# Patient Record
Sex: MALE | Race: WHITE | NOT HISPANIC OR LATINO
[De-identification: names, ages, dates, MRNs, and addresses within clinical notes are randomized per-mention and may not be internally consistent; named-entity substitution may affect disease eponyms.]

---

## 2017-06-14 ENCOUNTER — APPOINTMENT (OUTPATIENT)
Dept: ORTHOPEDIC SURGERY | Facility: CLINIC | Age: 62
End: 2017-06-14

## 2017-06-14 VITALS
HEART RATE: 66 BPM | SYSTOLIC BLOOD PRESSURE: 134 MMHG | HEIGHT: 71 IN | WEIGHT: 155 LBS | BODY MASS INDEX: 21.7 KG/M2 | DIASTOLIC BLOOD PRESSURE: 90 MMHG

## 2017-06-14 DIAGNOSIS — S86.811A STRAIN OF OTHER MUSCLE(S) AND TENDON(S) AT LOWER LEG LEVEL, RIGHT LEG, INITIAL ENCOUNTER: ICD-10-CM

## 2017-06-14 DIAGNOSIS — Z87.891 PERSONAL HISTORY OF NICOTINE DEPENDENCE: ICD-10-CM

## 2017-06-14 DIAGNOSIS — Z78.9 OTHER SPECIFIED HEALTH STATUS: ICD-10-CM

## 2017-06-14 DIAGNOSIS — Z80.0 FAMILY HISTORY OF MALIGNANT NEOPLASM OF DIGESTIVE ORGANS: ICD-10-CM

## 2018-08-06 ENCOUNTER — APPOINTMENT (OUTPATIENT)
Dept: ORTHOPEDIC SURGERY | Facility: CLINIC | Age: 63
End: 2018-08-06
Payer: SELF-PAY

## 2018-08-06 VITALS
HEART RATE: 78 BPM | BODY MASS INDEX: 21.98 KG/M2 | WEIGHT: 157 LBS | DIASTOLIC BLOOD PRESSURE: 85 MMHG | SYSTOLIC BLOOD PRESSURE: 135 MMHG | HEIGHT: 71 IN

## 2018-08-06 PROCEDURE — 99204 OFFICE O/P NEW MOD 45 MIN: CPT

## 2018-08-06 PROCEDURE — 73502 X-RAY EXAM HIP UNI 2-3 VIEWS: CPT | Mod: LT

## 2018-09-25 ENCOUNTER — APPOINTMENT (OUTPATIENT)
Dept: ORTHOPEDIC SURGERY | Facility: CLINIC | Age: 63
End: 2018-09-25
Payer: MEDICAID

## 2018-09-25 ENCOUNTER — TRANSCRIPTION ENCOUNTER (OUTPATIENT)
Age: 63
End: 2018-09-25

## 2018-09-25 VITALS
DIASTOLIC BLOOD PRESSURE: 100 MMHG | HEART RATE: 68 BPM | SYSTOLIC BLOOD PRESSURE: 161 MMHG | HEIGHT: 71 IN | WEIGHT: 157 LBS | BODY MASS INDEX: 21.98 KG/M2

## 2018-09-25 PROCEDURE — 99214 OFFICE O/P EST MOD 30 MIN: CPT

## 2018-09-25 PROCEDURE — 73502 X-RAY EXAM HIP UNI 2-3 VIEWS: CPT | Mod: LT

## 2018-11-13 ENCOUNTER — OUTPATIENT (OUTPATIENT)
Dept: OUTPATIENT SERVICES | Facility: HOSPITAL | Age: 63
LOS: 1 days | End: 2018-11-13
Payer: MEDICAID

## 2018-11-13 ENCOUNTER — APPOINTMENT (OUTPATIENT)
Dept: RADIOLOGY | Facility: CLINIC | Age: 63
End: 2018-11-13
Payer: MEDICAID

## 2018-11-13 DIAGNOSIS — M16.12 UNILATERAL PRIMARY OSTEOARTHRITIS, LEFT HIP: ICD-10-CM

## 2018-11-13 PROCEDURE — 73525 CONTRAST X-RAY OF HIP: CPT | Mod: 26,LT

## 2018-11-13 PROCEDURE — 27093 INJECTION FOR HIP X-RAY: CPT | Mod: LT

## 2018-11-13 PROCEDURE — 27093 INJECTION FOR HIP X-RAY: CPT

## 2018-11-13 PROCEDURE — 73525 CONTRAST X-RAY OF HIP: CPT

## 2019-01-04 ENCOUNTER — CHART COPY (OUTPATIENT)
Age: 64
End: 2019-01-04

## 2019-01-29 ENCOUNTER — APPOINTMENT (OUTPATIENT)
Dept: ORTHOPEDIC SURGERY | Facility: CLINIC | Age: 64
End: 2019-01-29
Payer: MEDICAID

## 2019-01-29 VITALS — HEIGHT: 71 IN | BODY MASS INDEX: 22.12 KG/M2 | WEIGHT: 158 LBS

## 2019-01-29 PROCEDURE — 99214 OFFICE O/P EST MOD 30 MIN: CPT

## 2019-01-29 NOTE — DISCUSSION/SUMMARY
[de-identified] : This patient has left hip osteoarthritis.  He has failed a course of conservative management and would like to proceed with a direct anterior approach left total hip arthroplasty.  He is an excellent candidate for an ambulatory surgery and this will be potentially performed at our ambulatory surgery center.  He does understand that this is a relatively new program.  He understands that he will need to discharge home same day of surgery and he is willing and excited about participating in this program.\par \par The patient is an appropriate candidate for consideration of left total hip replacement. An extensive discussion was conducted of the natural history of the disease and the variety of surgical and non-surgical treatment options available to the patient. A risk/benefit analysis was discussed with the patient reviewing the advantages and disadvantages of surgical intervention at this time. Both the level and length of the patient's pain have made additional conservative treatment measures consisting of physical therapy, and/or corticosteroids contraindicated. A full explanation was given of the nature and the purpose of the procedure and anesthesia, its benefits, possible alternative methods of diagnosis or treatment, the risks involved, the possibility of complications, the foreseeable consequences of the procedure and the possible results of the non-treatment.\par No guarantee or assurance was made as to the results that may be obtained. Specifically, the risks were identified to include, but are not limited to the following: Infection, phlebitis, pulmonary embolism, death, paralysis, dislocation, pain, stiffness, instability, limp, weakness, breakage, leg-length inequality, uncontrolled bleeding, nerve injury, blood vessel injury, pressure sores, anesthetic risks, delayed healing of wound and bone, and wear and loosening. Further discussion was undertaken with the patient about the details of surgical preparation, treatment, and postoperative rehabilitation including medical clearance, autotransfusion, the hospital course, and the postoperative rehabilitation involved. All in all, I feel that this patient is a good candidate for surgical reconstruction.\par

## 2019-01-29 NOTE — HISTORY OF PRESENT ILLNESS
[de-identified] : This is very nice 63-year-old gentleman experiencing 3 years of left hip pain, which is severe in intensity. I have previously diagnosed left hip osteoarthritis. Patient endorses groin and thigh pain. The pain moderately limits activities of daily living. Walking tolerance is reduced. Physical therapy did nto help and gretchen did a home exercise program. He has also tried an intra-articular cortisone injection. He does not use a cane or walker. He has previously tried NSAIDs which do help slightly. The pain is not traveling down the leg and it is not associated with numbness or tingling or weakness.

## 2019-02-14 ENCOUNTER — OUTPATIENT (OUTPATIENT)
Dept: OUTPATIENT SERVICES | Facility: HOSPITAL | Age: 64
LOS: 1 days | End: 2019-02-14
Payer: MEDICAID

## 2019-02-14 ENCOUNTER — OTHER (OUTPATIENT)
Age: 64
End: 2019-02-14

## 2019-02-14 VITALS
TEMPERATURE: 98 F | HEART RATE: 67 BPM | OXYGEN SATURATION: 98 % | HEIGHT: 71 IN | DIASTOLIC BLOOD PRESSURE: 72 MMHG | WEIGHT: 158.95 LBS | SYSTOLIC BLOOD PRESSURE: 140 MMHG | RESPIRATION RATE: 16 BRPM

## 2019-02-14 DIAGNOSIS — M16.12 UNILATERAL PRIMARY OSTEOARTHRITIS, LEFT HIP: ICD-10-CM

## 2019-02-14 DIAGNOSIS — Z01.818 ENCOUNTER FOR OTHER PREPROCEDURAL EXAMINATION: ICD-10-CM

## 2019-02-14 DIAGNOSIS — Z29.9 ENCOUNTER FOR PROPHYLACTIC MEASURES, UNSPECIFIED: ICD-10-CM

## 2019-02-14 DIAGNOSIS — Z96.642 PRESENCE OF LEFT ARTIFICIAL HIP JOINT: Chronic | ICD-10-CM

## 2019-02-14 LAB
ANION GAP SERPL CALC-SCNC: 13 MMOL/L — SIGNIFICANT CHANGE UP (ref 5–17)
BLD GP AB SCN SERPL QL: NEGATIVE — SIGNIFICANT CHANGE UP
BUN SERPL-MCNC: 19 MG/DL — SIGNIFICANT CHANGE UP (ref 7–23)
CALCIUM SERPL-MCNC: 10.2 MG/DL — SIGNIFICANT CHANGE UP (ref 8.4–10.5)
CHLORIDE SERPL-SCNC: 103 MMOL/L — SIGNIFICANT CHANGE UP (ref 96–108)
CO2 SERPL-SCNC: 25 MMOL/L — SIGNIFICANT CHANGE UP (ref 22–31)
CREAT SERPL-MCNC: 0.97 MG/DL — SIGNIFICANT CHANGE UP (ref 0.5–1.3)
GLUCOSE SERPL-MCNC: 100 MG/DL — HIGH (ref 70–99)
HBA1C BLD-MCNC: 5.6 % — SIGNIFICANT CHANGE UP (ref 4–5.6)
HCT VFR BLD CALC: 46.5 % — SIGNIFICANT CHANGE UP (ref 39–50)
HGB BLD-MCNC: 15.5 G/DL — SIGNIFICANT CHANGE UP (ref 13–17)
MCHC RBC-ENTMCNC: 30.8 PG — SIGNIFICANT CHANGE UP (ref 27–34)
MCHC RBC-ENTMCNC: 33.3 GM/DL — SIGNIFICANT CHANGE UP (ref 32–36)
MCV RBC AUTO: 92.3 FL — SIGNIFICANT CHANGE UP (ref 80–100)
MRSA PCR RESULT.: SIGNIFICANT CHANGE UP
PLATELET # BLD AUTO: 243 K/UL — SIGNIFICANT CHANGE UP (ref 150–400)
POTASSIUM SERPL-MCNC: 4.4 MMOL/L — SIGNIFICANT CHANGE UP (ref 3.5–5.3)
POTASSIUM SERPL-SCNC: 4.4 MMOL/L — SIGNIFICANT CHANGE UP (ref 3.5–5.3)
RBC # BLD: 5.04 M/UL — SIGNIFICANT CHANGE UP (ref 4.2–5.8)
RBC # FLD: 13.3 % — SIGNIFICANT CHANGE UP (ref 10.3–14.5)
RH IG SCN BLD-IMP: POSITIVE — SIGNIFICANT CHANGE UP
S AUREUS DNA NOSE QL NAA+PROBE: SIGNIFICANT CHANGE UP
SODIUM SERPL-SCNC: 141 MMOL/L — SIGNIFICANT CHANGE UP (ref 135–145)
WBC # BLD: 5.23 K/UL — SIGNIFICANT CHANGE UP (ref 3.8–10.5)
WBC # FLD AUTO: 5.23 K/UL — SIGNIFICANT CHANGE UP (ref 3.8–10.5)

## 2019-02-14 PROCEDURE — G0463: CPT

## 2019-02-14 PROCEDURE — 86850 RBC ANTIBODY SCREEN: CPT

## 2019-02-14 PROCEDURE — 86900 BLOOD TYPING SEROLOGIC ABO: CPT

## 2019-02-14 PROCEDURE — 85027 COMPLETE CBC AUTOMATED: CPT

## 2019-02-14 PROCEDURE — 83036 HEMOGLOBIN GLYCOSYLATED A1C: CPT

## 2019-02-14 PROCEDURE — 87640 STAPH A DNA AMP PROBE: CPT

## 2019-02-14 PROCEDURE — 80048 BASIC METABOLIC PNL TOTAL CA: CPT

## 2019-02-14 PROCEDURE — 86901 BLOOD TYPING SEROLOGIC RH(D): CPT

## 2019-02-14 PROCEDURE — 87641 MR-STAPH DNA AMP PROBE: CPT

## 2019-02-14 RX ORDER — SODIUM CHLORIDE 9 MG/ML
3 INJECTION INTRAMUSCULAR; INTRAVENOUS; SUBCUTANEOUS EVERY 8 HOURS
Qty: 0 | Refills: 0 | Status: DISCONTINUED | OUTPATIENT
Start: 2019-02-28 | End: 2019-03-15

## 2019-02-14 RX ORDER — PANTOPRAZOLE SODIUM 20 MG/1
40 TABLET, DELAYED RELEASE ORAL ONCE
Qty: 0 | Refills: 0 | Status: COMPLETED | OUTPATIENT
Start: 2019-02-28 | End: 2019-02-28

## 2019-02-14 RX ORDER — LIDOCAINE HCL 20 MG/ML
0.2 VIAL (ML) INJECTION ONCE
Qty: 0 | Refills: 0 | Status: DISCONTINUED | OUTPATIENT
Start: 2019-02-28 | End: 2019-03-15

## 2019-02-14 RX ORDER — VANCOMYCIN HCL 1 G
1000 VIAL (EA) INTRAVENOUS ONCE
Qty: 0 | Refills: 0 | Status: DISCONTINUED | OUTPATIENT
Start: 2019-02-28 | End: 2019-02-28

## 2019-02-14 RX ORDER — GABAPENTIN 400 MG/1
300 CAPSULE ORAL ONCE
Qty: 0 | Refills: 0 | Status: DISCONTINUED | OUTPATIENT
Start: 2019-02-28 | End: 2019-03-15

## 2019-02-14 RX ORDER — ACETAMINOPHEN 500 MG
975 TABLET ORAL ONCE
Qty: 0 | Refills: 0 | Status: COMPLETED | OUTPATIENT
Start: 2019-02-28 | End: 2019-02-28

## 2019-02-14 RX ORDER — TRAMADOL HYDROCHLORIDE 50 MG/1
50 TABLET ORAL ONCE
Qty: 0 | Refills: 0 | Status: DISCONTINUED | OUTPATIENT
Start: 2019-02-28 | End: 2019-02-28

## 2019-02-14 NOTE — H&P PST ADULT - NEGATIVE CARDIOVASCULAR SYMPTOMS
no claudication/no orthopnea/no peripheral edema/no chest pain/no dyspnea on exertion/no paroxysmal nocturnal dyspnea/no palpitations

## 2019-02-14 NOTE — H&P PST ADULT - HISTORY OF PRESENT ILLNESS
This is a 63 year old male with past medical history of diverticulitis, complains of left hip pain seen by orthopedic, xray review positive for osteoarthritis of left hip. Presents today for left anterior hip replacement.

## 2019-02-14 NOTE — H&P PST ADULT - ASSESSMENT
Unilateral primary osteoarthritis, left hip.  CAPRINI SCORE [CLOT]    AGE RELATED RISK FACTORS                                                       MOBILITY RELATED FACTORS  [ ] Age 41-60 years                                            (1 Point)                  [ ] Bed rest                                                        (1 Point)  [x ] Age: 61-74 years                                           (2 Points)                 [ ] Plaster cast                                                   (2 Points)  [ ] Age= 75 years                                              (3 Points)                 [ ] Bed bound for more than 72 hours                 (2 Points)    DISEASE RELATED RISK FACTORS                                               GENDER SPECIFIC FACTORS  [ ] Edema in the lower extremities                       (1 Point)                  [ ] Pregnancy                                                     (1 Point)  [ ] Varicose veins                                               (1 Point)                  [ ] Post-partum < 6 weeks                                   (1 Point)             [ ] BMI > 25 Kg/m2                                            (1 Point)                  [ ] Hormonal therapy  or oral contraception          (1 Point)                 [ ] Sepsis (in the previous month)                        (1 Point)                  [ ] History of pregnancy complications                 (1 point)  [ ] Pneumonia or serious lung disease                                               [ ] Unexplained or recurrent                     (1 Point)           (in the previous month)                               (1 Point)  [ ] Abnormal pulmonary function test                     (1 Point)                 SURGERY RELATED RISK FACTORS  [ ] Acute myocardial infarction                              (1 Point)                 [ ]  Section                                             (1 Point)  [ ] Congestive heart failure (in the previous month)  (1 Point)               [ ] Minor surgery                                                  (1 Point)   [ ] Inflammatory bowel disease                             (1 Point)                 [ ] Arthroscopic surgery                                        (2 Points)  [ ] Central venous access                                      (2 Points)                [ ] General surgery lasting more than 45 minutes   (2 Points)       [ ] Stroke (in the previous month)                          (5 Points)               [x ] Elective arthroplasty                                         (5 Points)                                                                                                                                               HEMATOLOGY RELATED FACTORS                                                 TRAUMA RELATED RISK FACTORS  [ ] Prior episodes of VTE                                     (3 Points)                 [ ] Fracture of the hip, pelvis, or leg                       (5 Points)  [ ] Positive family history for VTE                         (3 Points)                 [ ] Acute spinal cord injury (in the previous month)  (5 Points)  [ ] Prothrombin 14728 A                                     (3 Points)                 [ ] Paralysis  (less than 1 month)                             (5 Points)  [ ] Factor V Leiden                                             (3 Points)                  [ ] Multiple Trauma within 1 month                        (5 Points)  [ ] Lupus anticoagulants                                     (3 Points)                                                           [ ] Anticardiolipin antibodies                               (3 Points)                                                       [ ] High homocysteine in the blood                      (3 Points)                                             [ ] Other congenital or acquired thrombophilia      (3 Points)                                                [ ] Heparin induced thrombocytopenia                  (3 Points)                                          Total Score [   7    ]

## 2019-02-27 ENCOUNTER — TRANSCRIPTION ENCOUNTER (OUTPATIENT)
Age: 64
End: 2019-02-27

## 2019-02-27 PROBLEM — M16.10 HIP ARTHRITIS: Status: RESOLVED | Noted: 2018-08-06 | Resolved: 2019-02-27

## 2019-02-27 PROBLEM — M16.12 ARTHRITIS OF LEFT HIP: Status: RESOLVED | Noted: 2018-09-24 | Resolved: 2019-02-27

## 2019-02-28 ENCOUNTER — APPOINTMENT (OUTPATIENT)
Dept: ORTHOPEDIC SURGERY | Facility: HOSPITAL | Age: 64
End: 2019-02-28

## 2019-02-28 ENCOUNTER — OUTPATIENT (OUTPATIENT)
Dept: OUTPATIENT SERVICES | Facility: HOSPITAL | Age: 64
LOS: 1 days | End: 2019-02-28
Payer: MEDICAID

## 2019-02-28 VITALS
TEMPERATURE: 98 F | HEIGHT: 71 IN | WEIGHT: 158.95 LBS | SYSTOLIC BLOOD PRESSURE: 140 MMHG | OXYGEN SATURATION: 99 % | DIASTOLIC BLOOD PRESSURE: 74 MMHG | HEART RATE: 67 BPM | RESPIRATION RATE: 16 BRPM

## 2019-02-28 VITALS
HEART RATE: 62 BPM | TEMPERATURE: 98 F | OXYGEN SATURATION: 99 % | SYSTOLIC BLOOD PRESSURE: 128 MMHG | DIASTOLIC BLOOD PRESSURE: 58 MMHG | RESPIRATION RATE: 16 BRPM

## 2019-02-28 DIAGNOSIS — M16.10 UNILATERAL PRIMARY OSTEOARTHRITIS, UNSPECIFIED HIP: ICD-10-CM

## 2019-02-28 DIAGNOSIS — Z96.642 PRESENCE OF LEFT ARTIFICIAL HIP JOINT: Chronic | ICD-10-CM

## 2019-02-28 DIAGNOSIS — M16.12 UNILATERAL PRIMARY OSTEOARTHRITIS, LEFT HIP: ICD-10-CM

## 2019-02-28 LAB
ANION GAP SERPL CALC-SCNC: 10 MMOL/L — SIGNIFICANT CHANGE UP (ref 5–17)
BUN SERPL-MCNC: 21 MG/DL — SIGNIFICANT CHANGE UP (ref 7–23)
CALCIUM SERPL-MCNC: 8.2 MG/DL — LOW (ref 8.4–10.5)
CHLORIDE SERPL-SCNC: 104 MMOL/L — SIGNIFICANT CHANGE UP (ref 96–108)
CO2 SERPL-SCNC: 24 MMOL/L — SIGNIFICANT CHANGE UP (ref 22–31)
CREAT SERPL-MCNC: 0.93 MG/DL — SIGNIFICANT CHANGE UP (ref 0.5–1.3)
GLUCOSE SERPL-MCNC: 107 MG/DL — HIGH (ref 70–99)
HCT VFR BLD CALC: 37.4 % — LOW (ref 39–50)
HGB BLD-MCNC: 13.1 G/DL — SIGNIFICANT CHANGE UP (ref 13–17)
MCHC RBC-ENTMCNC: 32.2 PG — SIGNIFICANT CHANGE UP (ref 27–34)
MCHC RBC-ENTMCNC: 35 GM/DL — SIGNIFICANT CHANGE UP (ref 32–36)
MCV RBC AUTO: 92 FL — SIGNIFICANT CHANGE UP (ref 80–100)
PLATELET # BLD AUTO: 156 K/UL — SIGNIFICANT CHANGE UP (ref 150–400)
POTASSIUM SERPL-MCNC: 4.6 MMOL/L — SIGNIFICANT CHANGE UP (ref 3.5–5.3)
POTASSIUM SERPL-SCNC: 4.6 MMOL/L — SIGNIFICANT CHANGE UP (ref 3.5–5.3)
RBC # BLD: 4.07 M/UL — LOW (ref 4.2–5.8)
RBC # FLD: 12.2 % — SIGNIFICANT CHANGE UP (ref 10.3–14.5)
SODIUM SERPL-SCNC: 138 MMOL/L — SIGNIFICANT CHANGE UP (ref 135–145)
WBC # BLD: 9.4 K/UL — SIGNIFICANT CHANGE UP (ref 3.8–10.5)
WBC # FLD AUTO: 9.4 K/UL — SIGNIFICANT CHANGE UP (ref 3.8–10.5)

## 2019-02-28 PROCEDURE — 86900 BLOOD TYPING SEROLOGIC ABO: CPT

## 2019-02-28 PROCEDURE — C1776: CPT

## 2019-02-28 PROCEDURE — 72170 X-RAY EXAM OF PELVIS: CPT

## 2019-02-28 PROCEDURE — 27130 TOTAL HIP ARTHROPLASTY: CPT | Mod: LT

## 2019-02-28 PROCEDURE — C1713: CPT

## 2019-02-28 PROCEDURE — 72170 X-RAY EXAM OF PELVIS: CPT | Mod: 26

## 2019-02-28 PROCEDURE — 97161 PT EVAL LOW COMPLEX 20 MIN: CPT

## 2019-02-28 PROCEDURE — 86901 BLOOD TYPING SEROLOGIC RH(D): CPT

## 2019-02-28 PROCEDURE — 97530 THERAPEUTIC ACTIVITIES: CPT

## 2019-02-28 PROCEDURE — 86850 RBC ANTIBODY SCREEN: CPT

## 2019-02-28 PROCEDURE — 85027 COMPLETE CBC AUTOMATED: CPT

## 2019-02-28 PROCEDURE — 97116 GAIT TRAINING THERAPY: CPT

## 2019-02-28 PROCEDURE — 80048 BASIC METABOLIC PNL TOTAL CA: CPT

## 2019-02-28 RX ORDER — CELECOXIB 200 MG/1
400 CAPSULE ORAL ONCE
Qty: 0 | Refills: 0 | Status: COMPLETED | OUTPATIENT
Start: 2019-02-28 | End: 2019-02-28

## 2019-02-28 RX ORDER — SODIUM CHLORIDE 9 MG/ML
500 INJECTION INTRAMUSCULAR; INTRAVENOUS; SUBCUTANEOUS ONCE
Qty: 0 | Refills: 0 | Status: COMPLETED | OUTPATIENT
Start: 2019-02-28 | End: 2019-02-28

## 2019-02-28 RX ORDER — TRAMADOL HYDROCHLORIDE 50 MG/1
1 TABLET ORAL
Qty: 28 | Refills: 0
Start: 2019-02-28

## 2019-02-28 RX ORDER — SENNA PLUS 8.6 MG/1
2 TABLET ORAL
Qty: 0 | Refills: 0 | DISCHARGE
Start: 2019-02-28

## 2019-02-28 RX ORDER — TRAMADOL HYDROCHLORIDE 50 MG/1
1 TABLET ORAL
Qty: 28 | Refills: 0 | OUTPATIENT
Start: 2019-02-28

## 2019-02-28 RX ORDER — PANTOPRAZOLE SODIUM 20 MG/1
40 TABLET, DELAYED RELEASE ORAL
Qty: 0 | Refills: 0 | Status: DISCONTINUED | OUTPATIENT
Start: 2019-02-28 | End: 2019-03-15

## 2019-02-28 RX ORDER — ASPIRIN/CALCIUM CARB/MAGNESIUM 324 MG
81 TABLET ORAL
Qty: 0 | Refills: 0 | Status: DISCONTINUED | OUTPATIENT
Start: 2019-02-28 | End: 2019-03-15

## 2019-02-28 RX ORDER — PANTOPRAZOLE SODIUM 20 MG/1
1 TABLET, DELAYED RELEASE ORAL
Qty: 30 | Refills: 0 | OUTPATIENT
Start: 2019-02-28 | End: 2019-03-29

## 2019-02-28 RX ORDER — PANTOPRAZOLE SODIUM 20 MG/1
1 TABLET, DELAYED RELEASE ORAL
Qty: 30 | Refills: 0
Start: 2019-02-28 | End: 2019-03-29

## 2019-02-28 RX ORDER — DOCUSATE SODIUM 100 MG
100 CAPSULE ORAL THREE TIMES A DAY
Qty: 0 | Refills: 0 | Status: DISCONTINUED | OUTPATIENT
Start: 2019-02-28 | End: 2019-03-15

## 2019-02-28 RX ORDER — ACETAMINOPHEN 500 MG
1000 TABLET ORAL ONCE
Qty: 0 | Refills: 0 | Status: COMPLETED | OUTPATIENT
Start: 2019-02-28 | End: 2019-02-28

## 2019-02-28 RX ORDER — SODIUM CHLORIDE 9 MG/ML
500 INJECTION INTRAMUSCULAR; INTRAVENOUS; SUBCUTANEOUS ONCE
Qty: 0 | Refills: 0 | Status: DISCONTINUED | OUTPATIENT
Start: 2019-02-28 | End: 2019-03-15

## 2019-02-28 RX ORDER — SODIUM CHLORIDE 9 MG/ML
1000 INJECTION, SOLUTION INTRAVENOUS
Qty: 0 | Refills: 0 | Status: DISCONTINUED | OUTPATIENT
Start: 2019-02-28 | End: 2019-03-15

## 2019-02-28 RX ORDER — TRAMADOL HYDROCHLORIDE 50 MG/1
50 TABLET ORAL EVERY 6 HOURS
Qty: 0 | Refills: 0 | Status: COMPLETED | OUTPATIENT
Start: 2019-02-28 | End: 2019-03-07

## 2019-02-28 RX ORDER — ASPIRIN/CALCIUM CARB/MAGNESIUM 324 MG
1 TABLET ORAL
Qty: 60 | Refills: 0
Start: 2019-02-28 | End: 2019-03-29

## 2019-02-28 RX ORDER — ASPIRIN/CALCIUM CARB/MAGNESIUM 324 MG
1 TABLET ORAL
Qty: 60 | Refills: 0 | OUTPATIENT
Start: 2019-02-28 | End: 2019-03-29

## 2019-02-28 RX ORDER — ONDANSETRON 8 MG/1
1 TABLET, FILM COATED ORAL
Qty: 21 | Refills: 0
Start: 2019-02-28

## 2019-02-28 RX ORDER — VANCOMYCIN HCL 1 G
1000 VIAL (EA) INTRAVENOUS ONCE
Qty: 0 | Refills: 0 | Status: DISCONTINUED | OUTPATIENT
Start: 2019-02-28 | End: 2019-02-28

## 2019-02-28 RX ORDER — DOCUSATE SODIUM 100 MG
1 CAPSULE ORAL
Qty: 0 | Refills: 0 | DISCHARGE
Start: 2019-02-28

## 2019-02-28 RX ORDER — CEFAZOLIN SODIUM 1 G
2000 VIAL (EA) INJECTION EVERY 8 HOURS
Qty: 0 | Refills: 0 | Status: DISCONTINUED | OUTPATIENT
Start: 2019-02-28 | End: 2019-03-15

## 2019-02-28 RX ORDER — OXYCODONE HYDROCHLORIDE 5 MG/1
10 TABLET ORAL EVERY 4 HOURS
Qty: 0 | Refills: 0 | Status: DISCONTINUED | OUTPATIENT
Start: 2019-02-28 | End: 2019-02-28

## 2019-02-28 RX ORDER — OXYCODONE HYDROCHLORIDE 5 MG/1
5 TABLET ORAL EVERY 4 HOURS
Qty: 0 | Refills: 0 | Status: DISCONTINUED | OUTPATIENT
Start: 2019-02-28 | End: 2019-02-28

## 2019-02-28 RX ORDER — OXYCODONE HYDROCHLORIDE 5 MG/1
1 TABLET ORAL
Qty: 60 | Refills: 0
Start: 2019-02-28

## 2019-02-28 RX ORDER — SENNA PLUS 8.6 MG/1
2 TABLET ORAL AT BEDTIME
Qty: 0 | Refills: 0 | Status: DISCONTINUED | OUTPATIENT
Start: 2019-02-28 | End: 2019-03-15

## 2019-02-28 RX ORDER — ONDANSETRON 8 MG/1
1 TABLET, FILM COATED ORAL
Qty: 21 | Refills: 0 | OUTPATIENT
Start: 2019-02-28

## 2019-02-28 RX ORDER — ACETAMINOPHEN 500 MG
1000 TABLET ORAL ONCE
Qty: 0 | Refills: 0 | Status: DISCONTINUED | OUTPATIENT
Start: 2019-02-28 | End: 2019-03-15

## 2019-02-28 RX ORDER — KETOROLAC TROMETHAMINE 30 MG/ML
15 SYRINGE (ML) INJECTION EVERY 8 HOURS
Qty: 0 | Refills: 0 | Status: COMPLETED | OUTPATIENT
Start: 2019-02-28 | End: 2019-03-01

## 2019-02-28 RX ORDER — ONDANSETRON 8 MG/1
4 TABLET, FILM COATED ORAL ONCE
Qty: 0 | Refills: 0 | Status: DISCONTINUED | OUTPATIENT
Start: 2019-02-28 | End: 2019-03-15

## 2019-02-28 RX ADMIN — TRAMADOL HYDROCHLORIDE 50 MILLIGRAM(S): 50 TABLET ORAL at 13:23

## 2019-02-28 RX ADMIN — TRAMADOL HYDROCHLORIDE 50 MILLIGRAM(S): 50 TABLET ORAL at 13:59

## 2019-02-28 RX ADMIN — CELECOXIB 400 MILLIGRAM(S): 200 CAPSULE ORAL at 07:00

## 2019-02-28 RX ADMIN — SODIUM CHLORIDE 3 MILLILITER(S): 9 INJECTION INTRAMUSCULAR; INTRAVENOUS; SUBCUTANEOUS at 10:46

## 2019-02-28 RX ADMIN — Medication 15 MILLIGRAM(S): at 14:26

## 2019-02-28 RX ADMIN — Medication 81 MILLIGRAM(S): at 15:56

## 2019-02-28 RX ADMIN — SODIUM CHLORIDE 666.67 MILLILITER(S): 9 INJECTION INTRAMUSCULAR; INTRAVENOUS; SUBCUTANEOUS at 09:48

## 2019-02-28 RX ADMIN — Medication 100 MILLIGRAM(S): at 15:11

## 2019-02-28 RX ADMIN — Medication 400 MILLIGRAM(S): at 13:27

## 2019-02-28 RX ADMIN — TRAMADOL HYDROCHLORIDE 50 MILLIGRAM(S): 50 TABLET ORAL at 07:00

## 2019-02-28 NOTE — BRIEF OPERATIVE NOTE - PROCEDURE
<<-----Click on this checkbox to enter Procedure Total hip arthroplasty by direct anterior approach  02/28/2019    Active  [id number]

## 2019-02-28 NOTE — ASU PREOP CHECKLIST - TO WHOM
Jatinder MILLS RN or 7 Jatinder MILLS RN or Kylah/STERLING Brown RN received report from LINA Estrada and gave report to KAR Combs RN

## 2019-02-28 NOTE — ASU DISCHARGE PLAN (ADULT/PEDIATRIC). - MEDICATION SUMMARY - MEDICATIONS TO TAKE
Patient arrives with EMS, per EMS patient was initially uncooperative but agreed to come in voluntarily, number for crisis worker on the chart if patient becomes uncooperative they will petition 36  Patient reported to EMS that she was cooperative for them but stated she will not be cooperative for staff in the ED because she does not like how we treat us   Patient initially uncooperative on arrival but is currently agreeable to change and provide a urine sample     Karthik Guerrero RN  09/11/18 [id number] I will START or STAY ON the medications listed below when I get home from the hospital:    balance of nature herb and fruit supplement 1 capsule orally once a day  -- Indication: For supplement    Tylenol  -- 975 milligram(s) by mouth every 8 hours, As Needed mild pain, temp >100.4F  -- Indication: For Pain/temp    Naprosyn 500 mg oral tablet  -- 1 tab(s) by mouth 2 times a day, As Needed for mild pain MDD:2  -- Check with your doctor before becoming pregnant.  May cause drowsiness or dizziness.  Obtain medical advice before taking any non-prescription drugs as some may affect the action of this medication.  Take with food or milk.    -- Indication: For Pain    oxyCODONE 5 mg oral tablet  -- 1-2 tab(s) by mouth every 4-6 hours, As needed, Moderate to severe pain. MDD:8  -- Indication: For Pain    traMADol 50 mg oral tablet  -- 1 tab(s) by mouth every 6 hours MDD:4  -- Indication: For Pain    aspirin 81 mg oral delayed release tablet  -- 1 tab(s) by mouth 2 times a day x 4 weeks total for dvt prevention MDD:2  -- Indication: For dvt prevention    Zofran ODT 4 mg oral tablet, disintegrating  -- 1 tab(s) by mouth 3 times a day, As Needed for nausea/vomiting MDD:3  -- Indication: For nausea    senna oral tablet  -- 2 tab(s) by mouth once a day (at bedtime)  -- Indication: For laxative    docusate sodium 100 mg oral capsule  -- 1 cap(s) by mouth 3 times a day  -- Indication: For stool softener    pantoprazole 40 mg oral delayed release tablet  -- 1 tab(s) by mouth once a day (before a meal) MDD:1  -- Indication: For GI prevention

## 2019-02-28 NOTE — ASU DISCHARGE PLAN (ADULT/PEDIATRIC). - NOTIFY
Persistent Nausea and Vomiting/Unable to Urinate/Bleeding that does not stop/Numbness, tingling/Swelling that continues/Excessive Diarrhea/Fever greater than 101/Inability to Tolerate Liquids or Foods/Increased Irritability or Sluggishness/Pain not relieved by Medications/Numbness, color, or temperature change to extremity

## 2019-02-28 NOTE — ASU DISCHARGE PLAN (ADULT/PEDIATRIC). - SPECIAL INSTRUCTIONS
Take aspirin 81 twice daily x 4 weeks total for dvt prevention.  Keep dressing on until date listed on dressing.  PT-weight bearing as tolerated

## 2019-03-08 PROBLEM — K57.92 DIVERTICULITIS OF INTESTINE, PART UNSPECIFIED, WITHOUT PERFORATION OR ABSCESS WITHOUT BLEEDING: Chronic | Status: ACTIVE | Noted: 2019-02-14

## 2019-03-14 ENCOUNTER — CLINICAL ADVICE (OUTPATIENT)
Age: 64
End: 2019-03-14

## 2019-04-01 PROBLEM — Z96.642 STATUS POST TOTAL REPLACEMENT OF LEFT HIP: Status: ACTIVE | Noted: 2019-02-27

## 2019-04-02 ENCOUNTER — APPOINTMENT (OUTPATIENT)
Dept: ORTHOPEDIC SURGERY | Facility: CLINIC | Age: 64
End: 2019-04-02
Payer: MEDICAID

## 2019-04-02 DIAGNOSIS — Z96.642 PRESENCE OF LEFT ARTIFICIAL HIP JOINT: ICD-10-CM

## 2019-04-02 PROCEDURE — 73502 X-RAY EXAM HIP UNI 2-3 VIEWS: CPT | Mod: LT

## 2019-04-02 PROCEDURE — 99024 POSTOP FOLLOW-UP VISIT: CPT

## 2019-04-02 NOTE — DISCUSSION/SUMMARY
[de-identified] : 1 month status post left total hip arthroplasty doing very well.  Written infectious precautions were reviewed. The patient will progress to a cane at this time. Aspirin therapy will be discontinued for the purpose of orthopedic thromboembolism prophylaxis. Return in four to five months for follow-up evaluation.

## 2019-04-02 NOTE — HISTORY OF PRESENT ILLNESS
[de-identified] : Status-post left total hip arthroplasty here for initial 1 month postoperative evaluation. Excellent progress is noted in terms of pain and restoration of function. Pain is well controlled with oral medications. There has been no change in medical health since discharge. The patient does not require assistive devices.  He is very pleased with his progress.  He is feeling very well.  He is back doing all of the activities that he desires.  In fact he admits to having practices golfing and is driving golf balls more than 200 yards on a golf stimulator.

## 2019-04-02 NOTE — PHYSICAL EXAM
[de-identified] : Examination reveals satisfactory wound healing. No surrounding erythema. Motion is good and relatively pain free. Leg lengths are acceptable. [de-identified] : AP pelvis, AP hip, and lateral x-rays of the left hip were reviewed. Satisfactory position and alignment of the components are present. No signs of loosening are seen.

## 2019-05-29 ENCOUNTER — FORM ENCOUNTER (OUTPATIENT)
Age: 64
End: 2019-05-29

## 2019-12-11 ENCOUNTER — APPOINTMENT (OUTPATIENT)
Dept: SURGERY | Facility: CLINIC | Age: 64
End: 2019-12-11
Payer: MEDICAID

## 2019-12-11 VITALS
TEMPERATURE: 98.3 F | HEIGHT: 71 IN | OXYGEN SATURATION: 97 % | HEART RATE: 56 BPM | WEIGHT: 158 LBS | BODY MASS INDEX: 22.12 KG/M2 | SYSTOLIC BLOOD PRESSURE: 152 MMHG | RESPIRATION RATE: 15 BRPM | DIASTOLIC BLOOD PRESSURE: 94 MMHG

## 2019-12-11 DIAGNOSIS — K42.9 UMBILICAL HERNIA W/OUT OBSTRUCTION OR GANGRENE: ICD-10-CM

## 2019-12-11 PROCEDURE — 99204 OFFICE O/P NEW MOD 45 MIN: CPT

## 2019-12-11 RX ORDER — NAPROXEN 500 MG/1
500 TABLET ORAL
Qty: 20 | Refills: 0 | Status: DISCONTINUED | COMMUNITY
Start: 2017-06-10 | End: 2019-12-11

## 2019-12-11 RX ORDER — MELOXICAM 15 MG/1
15 TABLET ORAL DAILY
Qty: 30 | Refills: 2 | Status: DISCONTINUED | COMMUNITY
Start: 2018-09-25 | End: 2019-12-11

## 2019-12-11 RX ORDER — CYCLOBENZAPRINE HYDROCHLORIDE 10 MG/1
10 TABLET, FILM COATED ORAL
Qty: 21 | Refills: 0 | Status: DISCONTINUED | COMMUNITY
Start: 2017-06-10 | End: 2019-12-11

## 2019-12-11 RX ORDER — ATORVASTATIN CALCIUM 20 MG/1
20 TABLET, FILM COATED ORAL
Qty: 30 | Refills: 0 | Status: DISCONTINUED | COMMUNITY
Start: 2019-12-09

## 2020-01-06 ENCOUNTER — TRANSCRIPTION ENCOUNTER (OUTPATIENT)
Age: 65
End: 2020-01-06

## 2020-01-06 NOTE — CONSULT LETTER
[Dear  ___] : Dear  [unfilled], [Consult Letter:] : I had the pleasure of evaluating your patient, [unfilled]. [Please see my note below.] : Please see my note below. [Consult Closing:] : Thank you very much for allowing me to participate in the care of this patient.  If you have any questions, please do not hesitate to contact me. [Sincerely,] : Sincerely, [FreeTextEntry2] : BARRY Puente [FreeTextEntry3] : Marie Cook M.D., F.A.C.S., F.ALEXIS.S.C.R.S.\par Assistant Professor of Surgery\par [employer] at [facility]\par \par

## 2020-01-06 NOTE — HISTORY OF PRESENT ILLNESS
[de-identified] : Rupert is a 64 y/o male with c/o a right groin bulge he noticed 2-3 weeks ago as he was exiting the shower. Bulge is always reducible and has not changed in size. Sometimes hears "gurgling."  He also reports having an umbilical bulge for about five years. Denies nausea, vomiting, constipation.

## 2020-01-06 NOTE — ASSESSMENT
[FreeTextEntry1] : 62 yo male with a right inguinal hernia. I recommended open repair with mesh and explained the details, risks, benefits and alternatives of the procedure to the patient. I discussed the risks and benefits of mesh placement. \par As far as the umbilical hernia this has been long-standing and asymptomatic and advised against repair at this time. \par He wishes to proceed with the [facility] and will schedule the surgery with my office.\par I educated him on the emergency symptoms of a hernia.\par

## 2020-01-24 ENCOUNTER — OUTPATIENT (OUTPATIENT)
Dept: OUTPATIENT SERVICES | Facility: HOSPITAL | Age: 65
LOS: 1 days | End: 2020-01-24
Payer: MEDICAID

## 2020-01-24 VITALS
DIASTOLIC BLOOD PRESSURE: 95 MMHG | HEART RATE: 51 BPM | OXYGEN SATURATION: 97 % | TEMPERATURE: 97 F | SYSTOLIC BLOOD PRESSURE: 150 MMHG | RESPIRATION RATE: 18 BRPM | WEIGHT: 160.06 LBS | HEIGHT: 71 IN

## 2020-01-24 DIAGNOSIS — K40.90 UNILATERAL INGUINAL HERNIA, WITHOUT OBSTRUCTION OR GANGRENE, NOT SPECIFIED AS RECURRENT: ICD-10-CM

## 2020-01-24 DIAGNOSIS — Z96.641 PRESENCE OF RIGHT ARTIFICIAL HIP JOINT: Chronic | ICD-10-CM

## 2020-01-24 DIAGNOSIS — Z01.818 ENCOUNTER FOR OTHER PREPROCEDURAL EXAMINATION: ICD-10-CM

## 2020-01-24 DIAGNOSIS — E78.5 HYPERLIPIDEMIA, UNSPECIFIED: ICD-10-CM

## 2020-01-24 LAB
HCT VFR BLD CALC: 46.4 % — SIGNIFICANT CHANGE UP (ref 39–50)
HGB BLD-MCNC: 15.1 G/DL — SIGNIFICANT CHANGE UP (ref 13–17)
MCHC RBC-ENTMCNC: 29.8 PG — SIGNIFICANT CHANGE UP (ref 27–34)
MCHC RBC-ENTMCNC: 32.5 GM/DL — SIGNIFICANT CHANGE UP (ref 32–36)
MCV RBC AUTO: 91.7 FL — SIGNIFICANT CHANGE UP (ref 80–100)
PLATELET # BLD AUTO: 216 K/UL — SIGNIFICANT CHANGE UP (ref 150–400)
RBC # BLD: 5.06 M/UL — SIGNIFICANT CHANGE UP (ref 4.2–5.8)
RBC # FLD: 13.1 % — SIGNIFICANT CHANGE UP (ref 10.3–14.5)
WBC # BLD: 4.71 K/UL — SIGNIFICANT CHANGE UP (ref 3.8–10.5)
WBC # FLD AUTO: 4.71 K/UL — SIGNIFICANT CHANGE UP (ref 3.8–10.5)

## 2020-01-24 PROCEDURE — G0463: CPT

## 2020-01-24 PROCEDURE — 85027 COMPLETE CBC AUTOMATED: CPT

## 2020-01-24 RX ORDER — SODIUM CHLORIDE 9 MG/ML
3 INJECTION INTRAMUSCULAR; INTRAVENOUS; SUBCUTANEOUS EVERY 8 HOURS
Refills: 0 | Status: DISCONTINUED | OUTPATIENT
Start: 2020-01-31 | End: 2020-02-17

## 2020-01-24 RX ORDER — LIDOCAINE HCL 20 MG/ML
0.2 VIAL (ML) INJECTION ONCE
Refills: 0 | Status: DISCONTINUED | OUTPATIENT
Start: 2020-01-31 | End: 2020-02-17

## 2020-01-24 RX ORDER — CHLORHEXIDINE GLUCONATE 213 G/1000ML
1 SOLUTION TOPICAL ONCE
Refills: 0 | Status: DISCONTINUED | OUTPATIENT
Start: 2020-01-31 | End: 2020-02-17

## 2020-01-24 RX ORDER — ACETAMINOPHEN 500 MG
975 TABLET ORAL
Qty: 0 | Refills: 0 | DISCHARGE

## 2020-01-24 NOTE — H&P PST ADULT - HISTORY OF PRESENT ILLNESS
This is a 63 y/o male c/o right inguinal hernia, he presents today for This is a 63 y/o male c/o right inguinal hernia, he presents today for right inguinal hernia repair with mesh

## 2020-01-24 NOTE — H&P PST ADULT - NSICDXPROBLEM_GEN_ALL_CORE_FT
PROBLEM DIAGNOSES  Problem: Unilateral inguinal hernia, without obstruction or gangrene, not specified as recurrent  Assessment and Plan: open right inguinal hernia repair with mesh    Problem: Hyperlipidemia  Assessment and Plan: continue statin

## 2020-01-30 ENCOUNTER — TRANSCRIPTION ENCOUNTER (OUTPATIENT)
Age: 65
End: 2020-01-30

## 2020-01-31 ENCOUNTER — RESULT REVIEW (OUTPATIENT)
Age: 65
End: 2020-01-31

## 2020-01-31 ENCOUNTER — APPOINTMENT (OUTPATIENT)
Dept: SURGERY | Facility: HOSPITAL | Age: 65
End: 2020-01-31
Payer: MEDICAID

## 2020-01-31 ENCOUNTER — OUTPATIENT (OUTPATIENT)
Dept: OUTPATIENT SERVICES | Facility: HOSPITAL | Age: 65
LOS: 1 days | End: 2020-01-31
Payer: MEDICAID

## 2020-01-31 VITALS
RESPIRATION RATE: 16 BRPM | SYSTOLIC BLOOD PRESSURE: 143 MMHG | OXYGEN SATURATION: 99 % | WEIGHT: 160.06 LBS | TEMPERATURE: 98 F | DIASTOLIC BLOOD PRESSURE: 88 MMHG | HEART RATE: 57 BPM | HEIGHT: 71 IN

## 2020-01-31 VITALS
TEMPERATURE: 97 F | OXYGEN SATURATION: 99 % | HEART RATE: 60 BPM | RESPIRATION RATE: 18 BRPM | SYSTOLIC BLOOD PRESSURE: 133 MMHG | DIASTOLIC BLOOD PRESSURE: 85 MMHG

## 2020-01-31 DIAGNOSIS — Z96.642 PRESENCE OF LEFT ARTIFICIAL HIP JOINT: Chronic | ICD-10-CM

## 2020-01-31 DIAGNOSIS — Z96.641 PRESENCE OF RIGHT ARTIFICIAL HIP JOINT: Chronic | ICD-10-CM

## 2020-01-31 DIAGNOSIS — Z01.818 ENCOUNTER FOR OTHER PREPROCEDURAL EXAMINATION: ICD-10-CM

## 2020-01-31 DIAGNOSIS — K40.90 UNILATERAL INGUINAL HERNIA, WITHOUT OBSTRUCTION OR GANGRENE, NOT SPECIFIED AS RECURRENT: ICD-10-CM

## 2020-01-31 PROCEDURE — 88304 TISSUE EXAM BY PATHOLOGIST: CPT

## 2020-01-31 PROCEDURE — 88302 TISSUE EXAM BY PATHOLOGIST: CPT

## 2020-01-31 PROCEDURE — 55520 REMOVAL OF SPERM CORD LESION: CPT | Mod: 59,RT

## 2020-01-31 PROCEDURE — 49505 PRP I/HERN INIT REDUC >5 YR: CPT | Mod: RT

## 2020-01-31 PROCEDURE — 88302 TISSUE EXAM BY PATHOLOGIST: CPT | Mod: 26

## 2020-01-31 PROCEDURE — 88304 TISSUE EXAM BY PATHOLOGIST: CPT | Mod: 26

## 2020-01-31 PROCEDURE — C1781: CPT

## 2020-01-31 PROCEDURE — C1889: CPT

## 2020-01-31 RX ORDER — ACETAMINOPHEN 500 MG
1000 TABLET ORAL ONCE
Refills: 0 | Status: COMPLETED | OUTPATIENT
Start: 2020-01-31 | End: 2020-01-31

## 2020-01-31 RX ORDER — OXYCODONE HYDROCHLORIDE 5 MG/1
5 TABLET ORAL ONCE
Refills: 0 | Status: DISCONTINUED | OUTPATIENT
Start: 2020-01-31 | End: 2020-01-31

## 2020-01-31 RX ORDER — CELECOXIB 200 MG/1
200 CAPSULE ORAL ONCE
Refills: 0 | Status: COMPLETED | OUTPATIENT
Start: 2020-01-31 | End: 2020-01-31

## 2020-01-31 RX ORDER — OXYCODONE HYDROCHLORIDE 5 MG/1
1 TABLET ORAL
Qty: 6 | Refills: 0
Start: 2020-01-31

## 2020-01-31 RX ORDER — CEFAZOLIN SODIUM 1 G
2000 VIAL (EA) INJECTION ONCE
Refills: 0 | Status: COMPLETED | OUTPATIENT
Start: 2020-01-31 | End: 2020-01-31

## 2020-01-31 RX ORDER — ONDANSETRON 8 MG/1
4 TABLET, FILM COATED ORAL ONCE
Refills: 0 | Status: DISCONTINUED | OUTPATIENT
Start: 2020-01-31 | End: 2020-02-17

## 2020-01-31 RX ORDER — SODIUM CHLORIDE 9 MG/ML
1000 INJECTION, SOLUTION INTRAVENOUS
Refills: 0 | Status: DISCONTINUED | OUTPATIENT
Start: 2020-01-31 | End: 2020-02-17

## 2020-01-31 RX ADMIN — CELECOXIB 200 MILLIGRAM(S): 200 CAPSULE ORAL at 06:38

## 2020-01-31 RX ADMIN — Medication 1000 MILLIGRAM(S): at 06:38

## 2020-01-31 NOTE — ASU DISCHARGE PLAN (ADULT/PEDIATRIC) - CALL YOUR DOCTOR IF YOU HAVE ANY OF THE FOLLOWING:
Inability to tolerate liquids or foods/Nausea and vomiting that does not stop/Pain not relieved by Medications/Wound/Surgical Site with redness, or foul smelling discharge or pus/Bleeding that does not stop/Swelling that gets worse

## 2020-01-31 NOTE — ASU DISCHARGE PLAN (ADULT/PEDIATRIC) - CARE PROVIDER_API CALL
Marie Cook)  ColonRectal Surgery; Surgery  [address], [address]  [place] [address]  Phone: [phone]  Fax: [phone]  Follow Up Time:

## 2020-01-31 NOTE — BRIEF OPERATIVE NOTE - OPERATION/FINDINGS
Right indirect inguinal hernia associated with cord lipoma and hernia sac which was excised and oversewn. Ilioinguinal nerve was identified and resected. ProGrip mesh utilized in hernia repair.

## 2020-01-31 NOTE — ASU DISCHARGE PLAN (ADULT/PEDIATRIC) - A. DRIVE A CAR, OPERATE POWER TOOLS OR MACHINERY
I will STOP taking the medications listed below when I get home from the hospital:  None
Statement Selected

## 2020-01-31 NOTE — ASU DISCHARGE PLAN (ADULT/PEDIATRIC) - NURSING INSTRUCTIONS
Tylenol/acetaminophen------------------------AND/OR--------------------------Motrin/ibuprofen as needed for pain/discomfort.  NEXT DOSES:   OK  @  12:00pm this afternoon, f needed.  Please read and follow preprinted MD-specific instruction sheet provided.  Follow up with MD as requested; call office for appointment.

## 2020-02-07 PROBLEM — E78.5 HYPERLIPIDEMIA, UNSPECIFIED: Chronic | Status: ACTIVE | Noted: 2020-01-24

## 2020-02-07 LAB — SURGICAL PATHOLOGY STUDY: SIGNIFICANT CHANGE UP

## 2020-02-19 ENCOUNTER — APPOINTMENT (OUTPATIENT)
Dept: SURGERY | Facility: CLINIC | Age: 65
End: 2020-02-19
Payer: MEDICAID

## 2020-02-19 VITALS
OXYGEN SATURATION: 98 % | SYSTOLIC BLOOD PRESSURE: 150 MMHG | TEMPERATURE: 97.4 F | RESPIRATION RATE: 16 BRPM | HEART RATE: 68 BPM | DIASTOLIC BLOOD PRESSURE: 85 MMHG

## 2020-02-19 DIAGNOSIS — Z09 ENCOUNTER FOR FOLLOW-UP EXAMINATION AFTER COMPLETED TREATMENT FOR CONDITIONS OTHER THAN MALIGNANT NEOPLASM: ICD-10-CM

## 2020-02-19 DIAGNOSIS — Z87.19 PERSONAL HISTORY OF OTHER DISEASES OF THE DIGESTIVE SYSTEM: ICD-10-CM

## 2020-02-19 PROCEDURE — 99024 POSTOP FOLLOW-UP VISIT: CPT

## 2020-02-20 PROBLEM — Z87.19 HISTORY OF RIGHT INGUINAL HERNIA: Status: RESOLVED | Noted: 2019-12-11 | Resolved: 2020-02-20

## 2020-02-20 PROBLEM — Z09 POSTOPERATIVE EXAMINATION: Status: ACTIVE | Noted: 2020-02-20

## 2020-02-20 NOTE — ASSESSMENT
[FreeTextEntry1] : Doing well postop.\par Path discussed. \par Instructions for activity, wound care given, all questions answered.\par RTO in 4-5 weeks.\par \par

## 2020-02-20 NOTE — HISTORY OF PRESENT ILLNESS
[de-identified] : Today patient reports feeling well. Denies pain, fever, wound problems or constipation. Not taking any pain medication.

## 2020-02-20 NOTE — CONSULT LETTER
[Dear  ___] : Dear  [unfilled], [Courtesy Letter:] : I had the pleasure of seeing your patient, [unfilled], in my office today. [Please see my note below.] : Please see my note below. [Sincerely,] : Sincerely, [Consult Closing:] : Thank you very much for allowing me to participate in the care of this patient.  If you have any questions, please do not hesitate to contact me. [FreeTextEntry2] : BARRY Puente [FreeTextEntry3] : Marie Cook M.D., F.A.C.S., F.ALEXIS.S.C.R.S.\par Assistant Professor of Surgery\par [employer] at [facility]\par \par

## 2020-02-20 NOTE — REASON FOR VISIT
[Post Op: _________] : a [unfilled] post op visit [FreeTextEntry1] : Open repair of right inguinal hernia with mesh 1/31/2019

## 2020-04-22 ENCOUNTER — APPOINTMENT (OUTPATIENT)
Dept: SURGERY | Facility: CLINIC | Age: 65
End: 2020-04-22

## 2020-06-08 ENCOUNTER — APPOINTMENT (OUTPATIENT)
Dept: SURGERY | Facility: CLINIC | Age: 65
End: 2020-06-08
Payer: MEDICAID

## 2020-06-10 ENCOUNTER — APPOINTMENT (OUTPATIENT)
Dept: SURGERY | Facility: CLINIC | Age: 65
End: 2020-06-10
Payer: MEDICAID

## 2020-06-10 VITALS
HEIGHT: 71 IN | WEIGHT: 156 LBS | RESPIRATION RATE: 18 BRPM | DIASTOLIC BLOOD PRESSURE: 95 MMHG | HEART RATE: 55 BPM | BODY MASS INDEX: 21.84 KG/M2 | SYSTOLIC BLOOD PRESSURE: 151 MMHG | OXYGEN SATURATION: 97 %

## 2020-06-10 DIAGNOSIS — R10.31 RIGHT LOWER QUADRANT PAIN: ICD-10-CM

## 2020-06-10 PROCEDURE — 99212 OFFICE O/P EST SF 10 MIN: CPT

## 2020-06-10 RX ORDER — ATORVASTATIN CALCIUM 20 MG/1
20 TABLET, FILM COATED ORAL
Refills: 0 | Status: DISCONTINUED | COMMUNITY
End: 2020-06-10

## 2020-06-10 NOTE — HISTORY OF PRESENT ILLNESS
[de-identified] : H/o open repair of right inguinal hernia with mesh 1/31/2019. Last seen 02/19/20, doing well. Today pt reports that 10 days ago he developed new right groin pain that started after exercising.  Since then he has intermittent mild discomfort, burning; recurs with certain movements. He can do activities such as playing tennis without issues.  Denies bulge.  Denies N/V , fever or chills . Tolerating diet and having daily soft BM.

## 2020-06-10 NOTE — ASSESSMENT
[FreeTextEntry1] : 64-year-old male with new mild right groin pain and history of right inguinal hernia repair in January.  His pain is likely due to muscle strain and should resolve in the near future. I advised for rest.  He is to let me know if his pain persists after couple weeks.\par \par RTO as needed.

## 2022-09-22 ENCOUNTER — APPOINTMENT (OUTPATIENT)
Dept: ORTHOPEDIC SURGERY | Facility: CLINIC | Age: 67
End: 2022-09-22

## 2022-09-22 VITALS
DIASTOLIC BLOOD PRESSURE: 88 MMHG | HEART RATE: 54 BPM | SYSTOLIC BLOOD PRESSURE: 167 MMHG | WEIGHT: 156 LBS | HEIGHT: 71 IN | BODY MASS INDEX: 21.84 KG/M2

## 2022-09-22 DIAGNOSIS — M25.512 PAIN IN LEFT SHOULDER: ICD-10-CM

## 2022-09-22 PROCEDURE — 99203 OFFICE O/P NEW LOW 30 MIN: CPT

## 2022-09-22 PROCEDURE — 73030 X-RAY EXAM OF SHOULDER: CPT | Mod: LT

## 2022-11-10 ENCOUNTER — APPOINTMENT (OUTPATIENT)
Dept: ORTHOPEDIC SURGERY | Facility: CLINIC | Age: 67
End: 2022-11-10

## 2022-11-10 VITALS
WEIGHT: 158 LBS | TEMPERATURE: 97.6 F | DIASTOLIC BLOOD PRESSURE: 98 MMHG | OXYGEN SATURATION: 98 % | HEIGHT: 71 IN | HEART RATE: 72 BPM | SYSTOLIC BLOOD PRESSURE: 172 MMHG | BODY MASS INDEX: 22.12 KG/M2

## 2022-11-10 DIAGNOSIS — M75.02 ADHESIVE CAPSULITIS OF LEFT SHOULDER: ICD-10-CM

## 2022-11-10 DIAGNOSIS — M75.42 IMPINGEMENT SYNDROME OF LEFT SHOULDER: ICD-10-CM

## 2022-11-10 PROCEDURE — 99213 OFFICE O/P EST LOW 20 MIN: CPT

## 2023-02-28 ENCOUNTER — INPATIENT (INPATIENT)
Facility: HOSPITAL | Age: 68
LOS: 1 days | Discharge: HOME | DRG: 378 | End: 2023-03-02
Attending: STUDENT IN AN ORGANIZED HEALTH CARE EDUCATION/TRAINING PROGRAM | Admitting: STUDENT IN AN ORGANIZED HEALTH CARE EDUCATION/TRAINING PROGRAM
Payer: COMMERCIAL

## 2023-02-28 VITALS
HEIGHT: 71 IN | TEMPERATURE: 99 F | OXYGEN SATURATION: 98 % | RESPIRATION RATE: 16 BRPM | DIASTOLIC BLOOD PRESSURE: 100 MMHG | SYSTOLIC BLOOD PRESSURE: 172 MMHG | WEIGHT: 154.98 LBS | HEART RATE: 59 BPM

## 2023-02-28 DIAGNOSIS — Z96.642 PRESENCE OF LEFT ARTIFICIAL HIP JOINT: Chronic | ICD-10-CM

## 2023-02-28 LAB
ALBUMIN SERPL ELPH-MCNC: 4.5 G/DL — SIGNIFICANT CHANGE UP (ref 3.3–5)
ALP SERPL-CCNC: 67 U/L — SIGNIFICANT CHANGE UP (ref 40–120)
ALT FLD-CCNC: 13 U/L — SIGNIFICANT CHANGE UP (ref 10–45)
ANION GAP SERPL CALC-SCNC: 11 MMOL/L — SIGNIFICANT CHANGE UP (ref 5–17)
APPEARANCE UR: CLEAR — SIGNIFICANT CHANGE UP
APTT BLD: 30.4 SEC — SIGNIFICANT CHANGE UP (ref 27.5–35.5)
AST SERPL-CCNC: 18 U/L — SIGNIFICANT CHANGE UP (ref 10–40)
BACTERIA # UR AUTO: NEGATIVE — SIGNIFICANT CHANGE UP
BASE EXCESS BLDV CALC-SCNC: 3.7 MMOL/L — HIGH (ref -2–3)
BASOPHILS # BLD AUTO: 0.02 K/UL — SIGNIFICANT CHANGE UP (ref 0–0.2)
BASOPHILS NFR BLD AUTO: 0.4 % — SIGNIFICANT CHANGE UP (ref 0–2)
BILIRUB SERPL-MCNC: 0.2 MG/DL — SIGNIFICANT CHANGE UP (ref 0.2–1.2)
BILIRUB UR-MCNC: NEGATIVE — SIGNIFICANT CHANGE UP
BLD GP AB SCN SERPL QL: NEGATIVE — SIGNIFICANT CHANGE UP
BUN SERPL-MCNC: 22 MG/DL — SIGNIFICANT CHANGE UP (ref 7–23)
CA-I SERPL-SCNC: 1.24 MMOL/L — SIGNIFICANT CHANGE UP (ref 1.15–1.33)
CALCIUM SERPL-MCNC: 9.5 MG/DL — SIGNIFICANT CHANGE UP (ref 8.4–10.5)
CHLORIDE BLDV-SCNC: 104 MMOL/L — SIGNIFICANT CHANGE UP (ref 96–108)
CHLORIDE SERPL-SCNC: 104 MMOL/L — SIGNIFICANT CHANGE UP (ref 96–108)
CO2 BLDV-SCNC: 33 MMOL/L — HIGH (ref 22–26)
CO2 SERPL-SCNC: 27 MMOL/L — SIGNIFICANT CHANGE UP (ref 22–31)
COLOR SPEC: SIGNIFICANT CHANGE UP
CREAT SERPL-MCNC: 1.22 MG/DL — SIGNIFICANT CHANGE UP (ref 0.5–1.3)
DIFF PNL FLD: NEGATIVE — SIGNIFICANT CHANGE UP
EGFR: 65 ML/MIN/1.73M2 — SIGNIFICANT CHANGE UP
EOSINOPHIL # BLD AUTO: 0.21 K/UL — SIGNIFICANT CHANGE UP (ref 0–0.5)
EOSINOPHIL NFR BLD AUTO: 4.1 % — SIGNIFICANT CHANGE UP (ref 0–6)
EPI CELLS # UR: 0 /HPF — SIGNIFICANT CHANGE UP
FLUAV AG NPH QL: SIGNIFICANT CHANGE UP
FLUBV AG NPH QL: SIGNIFICANT CHANGE UP
GAS PNL BLDV: 140 MMOL/L — SIGNIFICANT CHANGE UP (ref 136–145)
GAS PNL BLDV: SIGNIFICANT CHANGE UP
GLUCOSE BLDV-MCNC: 83 MG/DL — SIGNIFICANT CHANGE UP (ref 70–99)
GLUCOSE SERPL-MCNC: 90 MG/DL — SIGNIFICANT CHANGE UP (ref 70–99)
GLUCOSE UR QL: NEGATIVE — SIGNIFICANT CHANGE UP
HCO3 BLDV-SCNC: 31 MMOL/L — HIGH (ref 22–29)
HCT VFR BLD CALC: 31.7 % — LOW (ref 39–50)
HCT VFR BLD CALC: 35.2 % — LOW (ref 39–50)
HCT VFR BLDA CALC: 37 % — LOW (ref 39–51)
HGB BLD CALC-MCNC: 12.3 G/DL — LOW (ref 12.6–17.4)
HGB BLD-MCNC: 10.6 G/DL — LOW (ref 13–17)
HGB BLD-MCNC: 11.9 G/DL — LOW (ref 13–17)
HYALINE CASTS # UR AUTO: 1 /LPF — SIGNIFICANT CHANGE UP (ref 0–2)
IMM GRANULOCYTES NFR BLD AUTO: 0.2 % — SIGNIFICANT CHANGE UP (ref 0–0.9)
INR BLD: 1.02 RATIO — SIGNIFICANT CHANGE UP (ref 0.88–1.16)
KETONES UR-MCNC: NEGATIVE — SIGNIFICANT CHANGE UP
LACTATE BLDV-MCNC: 0.7 MMOL/L — SIGNIFICANT CHANGE UP (ref 0.5–2)
LEUKOCYTE ESTERASE UR-ACNC: NEGATIVE — SIGNIFICANT CHANGE UP
LIDOCAIN IGE QN: 74 U/L — HIGH (ref 7–60)
LYMPHOCYTES # BLD AUTO: 1.9 K/UL — SIGNIFICANT CHANGE UP (ref 1–3.3)
LYMPHOCYTES # BLD AUTO: 37.3 % — SIGNIFICANT CHANGE UP (ref 13–44)
MCHC RBC-ENTMCNC: 30.6 PG — SIGNIFICANT CHANGE UP (ref 27–34)
MCHC RBC-ENTMCNC: 31 PG — SIGNIFICANT CHANGE UP (ref 27–34)
MCHC RBC-ENTMCNC: 33.4 GM/DL — SIGNIFICANT CHANGE UP (ref 32–36)
MCHC RBC-ENTMCNC: 33.8 GM/DL — SIGNIFICANT CHANGE UP (ref 32–36)
MCV RBC AUTO: 91.6 FL — SIGNIFICANT CHANGE UP (ref 80–100)
MCV RBC AUTO: 91.7 FL — SIGNIFICANT CHANGE UP (ref 80–100)
MONOCYTES # BLD AUTO: 0.57 K/UL — SIGNIFICANT CHANGE UP (ref 0–0.9)
MONOCYTES NFR BLD AUTO: 11.2 % — SIGNIFICANT CHANGE UP (ref 2–14)
NEUTROPHILS # BLD AUTO: 2.39 K/UL — SIGNIFICANT CHANGE UP (ref 1.8–7.4)
NEUTROPHILS NFR BLD AUTO: 46.8 % — SIGNIFICANT CHANGE UP (ref 43–77)
NITRITE UR-MCNC: NEGATIVE — SIGNIFICANT CHANGE UP
NRBC # BLD: 0 /100 WBCS — SIGNIFICANT CHANGE UP (ref 0–0)
NRBC # BLD: 0 /100 WBCS — SIGNIFICANT CHANGE UP (ref 0–0)
OB PNL STL: POSITIVE
PCO2 BLDV: 59 MMHG — HIGH (ref 42–55)
PH BLDV: 7.33 — SIGNIFICANT CHANGE UP (ref 7.32–7.43)
PH UR: 5.5 — SIGNIFICANT CHANGE UP (ref 5–8)
PLATELET # BLD AUTO: 188 K/UL — SIGNIFICANT CHANGE UP (ref 150–400)
PLATELET # BLD AUTO: 221 K/UL — SIGNIFICANT CHANGE UP (ref 150–400)
PO2 BLDV: 23 MMHG — LOW (ref 25–45)
POTASSIUM BLDV-SCNC: 3.6 MMOL/L — SIGNIFICANT CHANGE UP (ref 3.5–5.1)
POTASSIUM SERPL-MCNC: 3.9 MMOL/L — SIGNIFICANT CHANGE UP (ref 3.5–5.3)
POTASSIUM SERPL-SCNC: 3.9 MMOL/L — SIGNIFICANT CHANGE UP (ref 3.5–5.3)
PROT SERPL-MCNC: 6.9 G/DL — SIGNIFICANT CHANGE UP (ref 6–8.3)
PROT UR-MCNC: ABNORMAL
PROTHROM AB SERPL-ACNC: 11.7 SEC — SIGNIFICANT CHANGE UP (ref 10.5–13.4)
RBC # BLD: 3.46 M/UL — LOW (ref 4.2–5.8)
RBC # BLD: 3.84 M/UL — LOW (ref 4.2–5.8)
RBC # FLD: 13 % — SIGNIFICANT CHANGE UP (ref 10.3–14.5)
RBC # FLD: 13.1 % — SIGNIFICANT CHANGE UP (ref 10.3–14.5)
RBC CASTS # UR COMP ASSIST: 0 /HPF — SIGNIFICANT CHANGE UP (ref 0–4)
RH IG SCN BLD-IMP: POSITIVE — SIGNIFICANT CHANGE UP
RSV RNA NPH QL NAA+NON-PROBE: SIGNIFICANT CHANGE UP
SAO2 % BLDV: 37.1 % — LOW (ref 67–88)
SARS-COV-2 RNA SPEC QL NAA+PROBE: SIGNIFICANT CHANGE UP
SODIUM SERPL-SCNC: 142 MMOL/L — SIGNIFICANT CHANGE UP (ref 135–145)
SP GR SPEC: 1.02 — SIGNIFICANT CHANGE UP (ref 1.01–1.02)
UROBILINOGEN FLD QL: NEGATIVE — SIGNIFICANT CHANGE UP
WBC # BLD: 5.1 K/UL — SIGNIFICANT CHANGE UP (ref 3.8–10.5)
WBC # BLD: 5.42 K/UL — SIGNIFICANT CHANGE UP (ref 3.8–10.5)
WBC # FLD AUTO: 5.1 K/UL — SIGNIFICANT CHANGE UP (ref 3.8–10.5)
WBC # FLD AUTO: 5.42 K/UL — SIGNIFICANT CHANGE UP (ref 3.8–10.5)
WBC UR QL: 0 /HPF — SIGNIFICANT CHANGE UP (ref 0–5)

## 2023-02-28 PROCEDURE — 99285 EMERGENCY DEPT VISIT HI MDM: CPT

## 2023-02-28 PROCEDURE — 71045 X-RAY EXAM CHEST 1 VIEW: CPT | Mod: 26

## 2023-02-28 RX ORDER — PANTOPRAZOLE SODIUM 20 MG/1
80 TABLET, DELAYED RELEASE ORAL ONCE
Refills: 0 | Status: COMPLETED | OUTPATIENT
Start: 2023-02-28 | End: 2023-02-28

## 2023-02-28 RX ORDER — PANTOPRAZOLE SODIUM 20 MG/1
40 TABLET, DELAYED RELEASE ORAL EVERY 12 HOURS
Refills: 0 | Status: DISCONTINUED | OUTPATIENT
Start: 2023-03-01 | End: 2023-03-01

## 2023-02-28 RX ADMIN — PANTOPRAZOLE SODIUM 80 MILLIGRAM(S): 20 TABLET, DELAYED RELEASE ORAL at 21:05

## 2023-02-28 NOTE — ED PROVIDER NOTE - NS ED ATTENDING STATEMENT MOD
I have seen and examined this patient and fully participated in the care of this patient as the teaching attending.  The service was shared with the CLIF.  I reviewed and verified the documentation and independently performed the documented:

## 2023-02-28 NOTE — ED PROVIDER NOTE - CARE PLAN
Principal Discharge DX:	Melena   1 Principal Discharge DX:	Melena  Assessment and plan of treatment:	- 80mg IV pantoprazole  - monitor cbc  - active type and screen  - GI c/s  - admit to medicine

## 2023-02-28 NOTE — ED PROVIDER NOTE - PROGRESS NOTE DETAILS
PGY2 Rege performed rectal exam with RN as chaperone. Dark red/black stool noted. PGY2 Rege performed rectal exam with RN as chaperone. Dark red/maroon stool noted.

## 2023-02-28 NOTE — ED ADULT NURSE NOTE - NS ED NURSE REPORT GIVEN TO FT
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 The risks, benefits, and possible outcomes of this procedure were discussed along with the risks, benefits, and possible outcomes of other options for wound repair (including but not limited to: complex closure, other flaps, grafts, and second intention). The patient verbalized understanding and consent to the outlined procedure. The patient verbalized understanding that intraoperative conditions may necessitate a change in the outlined procedure resulting in modification of the original surgical plan. This decision may be made at the discretion of the surgeon, and is due to factors subject to change or that are difficult to predict preoperatively. Using a sterile surgical marker, an appropriate Burow’s advancement flap was drawn incorporating the defect and placing the expected incisions within the relaxed skin tension lines where possible. The surgical site and surrounding skin were prepped and draped in sterile fashion.  Standing cones were removed from opposite ends of the defect within the appropriate surgical plane, repositioning as necessary based upon local tension, proximity to free margins/other anatomic structures, and position of the relaxed skin tension lines. The position of one dog ear was modified, moving the standing cone along a lateral plane to lie in a more favorable location for closure.  The resulting defect was undermined widely and bilaterally in the appropriate surgical plane taking care to preserve local arteries, veins, nerves, and other structures of anatomic importance. This created a sliding flap capable of advancing across the defect to close the wound under minimal tension. Hemostasis was achieved and then the wound was sutured in layered fashion.

## 2023-02-28 NOTE — ED PROVIDER NOTE - OBJECTIVE STATEMENT
68yo M with no significant PMH presenting with black stools for the past 5 days. Endorses weakness, fatigue, dyspnea. Occasional lower abdomen discomfort. Notes hx of diverticulitis 12 years ago and colonoscopy 10 years ago. No hx of EGD. Denies anticoagulation or regular NSAID use. Only uses fruit/vegetable supplements, no other medications. No regular PCP.

## 2023-02-28 NOTE — ED PROVIDER NOTE - CLINICAL SUMMARY MEDICAL DECISION MAKING FREE TEXT BOX
66yo M with no significant PMH presenting with black stools for the past 5 days. Likely upper vs lower GI bleed given. Patient with melena, but maroon stools on digital rectal exam. Given IV pantoprazole 80mg. GI consulted. Admit to medicine.

## 2023-02-28 NOTE — ED PROVIDER NOTE - RAPID ASSESSMENT
66 y/o M c/o black stools, fatigue, and lower abd discomfort x5days. Denies bloody stools and abd pain. Pt is well appearing in the triage.    ILiban (Shai) have documented this rapid assessment note under the dictation of Hoang Grayson (PA) which has been reviewed and affirmed to be accurate. Patient was seen as a QPA patient. The patient will be seen and further worked up in the main emergency department and their care will be completed by the main emergency department team along with a thorough physical exam. Receiving team will follow up on labs, analgesia, any clinical imaging, reassess and disposition as clinically indicated, all decisions regarding the progression of care will be made at their discretion. 66 y/o M c/o black stools, fatigue, and lower abd discomfort x5days. Denies bloody stools and abd pain. Pt is well appearing in the triage.    Liban SALEEM (Scribcarlos) have documented this rapid assessment note under the dictation of Hoang Grayson (PA) which has been reviewed and affirmed to be accurate. Patient was seen as a QPA patient. The patient will be seen and further worked up in the main emergency department and their care will be completed by the main emergency department team along with a thorough physical exam. Receiving team will follow up on labs, analgesia, any clinical imaging, reassess and disposition as clinically indicated, all decisions regarding the progression of care will be made at their discretion.  Hoang SALEEM, personally performed the service described in the documentation recorded by the scribe in my presence, and it accurately and completely records my words and actions.

## 2023-02-28 NOTE — ED PROVIDER NOTE - ATTENDING CONTRIBUTION TO CARE
I have personally seen and examined this patient. I have fully participated in the care of this patient. I have made amendments to the documentation where appropriate and otherwise agree with the history, physical exam, and plan as documented by the Resident.     Attending Statement (AMARA Nunes MD):    HPI: 67-year-old male with no reported medical history presenting with black stools for the past 5 days.  Endorses feeling generalized weakness, fatigue and some shortness of breath.  Prior history of diverticulitis approximately 12 years ago states last colonoscopy was about 10 years ago with just diverticulosis noted.  No other complaints at present no abdominal pain no fever no nausea or vomiting.    Review of Systems:  -General: no fever   -ENT: no congestion  -Pulmonary: no cough, no shortness of breath  -Cardiac: no chest pain  -Gastrointestinal: See HPI.  -Genitourinary: no blood or pain with urination  -Musculoskeletal: no back or neck pain  -Skin: no rashes  -Endocrine: No h/o diabetes  -Neurologic: No new weakness or numbness in extremities    All else negative unless otherwise specified elsewhere in this note.    On Physical Exam:  General: well appearing, in NAD, speaking clearly in full sentences and without difficulty; cooperative with exam  HEENT: anicteric sclera, airway patent  Neck: no JVD  Cardiac: regular, s1 s2  Lungs: CTABL  Abdomen: soft nontender/nondistended  : no bladder tenderness or distension  Skin: intact, no rash  Extremities: no peripheral edema, no gross deformities      MDM: Concern for symptomatic anemia secondary to lower GI bleed given symptoms will obtain screening labs including CBC CMP type and screen.  Pending results of labs can consider CT bleeding study versus admission for GI evaluation.  If hemoglobin low or patient becomes unstable will initiate transfusion.

## 2023-02-28 NOTE — ED ADULT NURSE NOTE - OBJECTIVE STATEMENT
patient is a patient is a 68 y/o M with hx of HLD and diverticulitis who presents to the ED c/o 5 days of painless rectal bleeding, fatigue, and intermittent lower abd cramping x5 days. patient states that the stools have been "very dark", has hx of diverticulitis 12 years ago, states that this feels similar to this in the past. states that the blood is mostly in the stool and not covering the bowl. patient is a&ox4, PERRL. respirations even and unlabored. abdomen soft and nondistended. skin warm, dry, and intact. IV in place from [facility] VS FRANCISCO LUNA at bedside to assess

## 2023-02-28 NOTE — ED ADULT NURSE NOTE - NS ED NURSE RECORD ANOTHER VITAL SIGN
Pts mother and father verbalized understanding of discharge instructions. Pt is acting age appropriate, no distress noted, tracking RN in hallway.  
Yes

## 2023-03-01 DIAGNOSIS — D64.9 ANEMIA, UNSPECIFIED: ICD-10-CM

## 2023-03-01 DIAGNOSIS — Z29.9 ENCOUNTER FOR PROPHYLACTIC MEASURES, UNSPECIFIED: ICD-10-CM

## 2023-03-01 DIAGNOSIS — E78.5 HYPERLIPIDEMIA, UNSPECIFIED: ICD-10-CM

## 2023-03-01 DIAGNOSIS — R94.31 ABNORMAL ELECTROCARDIOGRAM [ECG] [EKG]: ICD-10-CM

## 2023-03-01 DIAGNOSIS — N18.2 CHRONIC KIDNEY DISEASE, STAGE 2 (MILD): ICD-10-CM

## 2023-03-01 DIAGNOSIS — Z98.890 OTHER SPECIFIED POSTPROCEDURAL STATES: Chronic | ICD-10-CM

## 2023-03-01 DIAGNOSIS — K92.1 MELENA: ICD-10-CM

## 2023-03-01 DIAGNOSIS — K92.2 GASTROINTESTINAL HEMORRHAGE, UNSPECIFIED: ICD-10-CM

## 2023-03-01 LAB
A1C WITH ESTIMATED AVERAGE GLUCOSE RESULT: 5.5 % — SIGNIFICANT CHANGE UP (ref 4–5.6)
ANION GAP SERPL CALC-SCNC: 11 MMOL/L — SIGNIFICANT CHANGE UP (ref 5–17)
BUN SERPL-MCNC: 19 MG/DL — SIGNIFICANT CHANGE UP (ref 7–23)
CALCIUM SERPL-MCNC: 9.3 MG/DL — SIGNIFICANT CHANGE UP (ref 8.4–10.5)
CHLORIDE SERPL-SCNC: 107 MMOL/L — SIGNIFICANT CHANGE UP (ref 96–108)
CHOLEST SERPL-MCNC: 218 MG/DL — HIGH
CO2 SERPL-SCNC: 25 MMOL/L — SIGNIFICANT CHANGE UP (ref 22–31)
CREAT SERPL-MCNC: 0.94 MG/DL — SIGNIFICANT CHANGE UP (ref 0.5–1.3)
EGFR: 89 ML/MIN/1.73M2 — SIGNIFICANT CHANGE UP
ESTIMATED AVERAGE GLUCOSE: 111 MG/DL — SIGNIFICANT CHANGE UP (ref 68–114)
GLUCOSE SERPL-MCNC: 94 MG/DL — SIGNIFICANT CHANGE UP (ref 70–99)
HCT VFR BLD CALC: 34.1 % — LOW (ref 39–50)
HDLC SERPL-MCNC: 54 MG/DL — SIGNIFICANT CHANGE UP
HGB BLD-MCNC: 11.6 G/DL — LOW (ref 13–17)
IRON SATN MFR SERPL: 27 % — SIGNIFICANT CHANGE UP (ref 16–55)
IRON SATN MFR SERPL: 71 UG/DL — SIGNIFICANT CHANGE UP (ref 45–165)
LIPID PNL WITH DIRECT LDL SERPL: 154 MG/DL — HIGH
MAGNESIUM SERPL-MCNC: 2 MG/DL — SIGNIFICANT CHANGE UP (ref 1.6–2.6)
MCHC RBC-ENTMCNC: 30.8 PG — SIGNIFICANT CHANGE UP (ref 27–34)
MCHC RBC-ENTMCNC: 34 GM/DL — SIGNIFICANT CHANGE UP (ref 32–36)
MCV RBC AUTO: 90.5 FL — SIGNIFICANT CHANGE UP (ref 80–100)
NON HDL CHOLESTEROL: 164 MG/DL — HIGH
NRBC # BLD: 0 /100 WBCS — SIGNIFICANT CHANGE UP (ref 0–0)
PHOSPHATE SERPL-MCNC: 2.8 MG/DL — SIGNIFICANT CHANGE UP (ref 2.5–4.5)
PLATELET # BLD AUTO: 200 K/UL — SIGNIFICANT CHANGE UP (ref 150–400)
POTASSIUM SERPL-MCNC: 4.1 MMOL/L — SIGNIFICANT CHANGE UP (ref 3.5–5.3)
POTASSIUM SERPL-SCNC: 4.1 MMOL/L — SIGNIFICANT CHANGE UP (ref 3.5–5.3)
RBC # BLD: 3.77 M/UL — LOW (ref 4.2–5.8)
RBC # FLD: 13.1 % — SIGNIFICANT CHANGE UP (ref 10.3–14.5)
SODIUM SERPL-SCNC: 143 MMOL/L — SIGNIFICANT CHANGE UP (ref 135–145)
TIBC SERPL-MCNC: 264 UG/DL — SIGNIFICANT CHANGE UP (ref 220–430)
TRIGL SERPL-MCNC: 50 MG/DL — SIGNIFICANT CHANGE UP
TROPONIN T, HIGH SENSITIVITY RESULT: 8 NG/L — SIGNIFICANT CHANGE UP (ref 0–51)
UIBC SERPL-MCNC: 194 UG/DL — SIGNIFICANT CHANGE UP (ref 110–370)
WBC # BLD: 4.76 K/UL — SIGNIFICANT CHANGE UP (ref 3.8–10.5)
WBC # FLD AUTO: 4.76 K/UL — SIGNIFICANT CHANGE UP (ref 3.8–10.5)

## 2023-03-01 PROCEDURE — 12345: CPT | Mod: NC

## 2023-03-01 PROCEDURE — 99232 SBSQ HOSP IP/OBS MODERATE 35: CPT | Mod: GC

## 2023-03-01 PROCEDURE — 74177 CT ABD & PELVIS W/CONTRAST: CPT | Mod: 26,MA

## 2023-03-01 PROCEDURE — 99223 1ST HOSP IP/OBS HIGH 75: CPT

## 2023-03-01 RX ORDER — ATORVASTATIN CALCIUM 80 MG/1
1 TABLET, FILM COATED ORAL
Qty: 0 | Refills: 0 | DISCHARGE

## 2023-03-01 RX ORDER — INFLUENZA VIRUS VACCINE 15; 15; 15; 15 UG/.5ML; UG/.5ML; UG/.5ML; UG/.5ML
0.7 SUSPENSION INTRAMUSCULAR ONCE
Refills: 0 | Status: DISCONTINUED | OUTPATIENT
Start: 2023-03-01 | End: 2023-03-02

## 2023-03-01 RX ORDER — ACETAMINOPHEN 500 MG
650 TABLET ORAL EVERY 6 HOURS
Refills: 0 | Status: DISCONTINUED | OUTPATIENT
Start: 2023-03-01 | End: 2023-03-02

## 2023-03-01 RX ORDER — SODIUM CHLORIDE 9 MG/ML
1000 INJECTION, SOLUTION INTRAVENOUS
Refills: 0 | Status: DISCONTINUED | OUTPATIENT
Start: 2023-03-01 | End: 2023-03-02

## 2023-03-01 RX ORDER — PANTOPRAZOLE SODIUM 20 MG/1
8 TABLET, DELAYED RELEASE ORAL
Qty: 80 | Refills: 0 | Status: DISCONTINUED | OUTPATIENT
Start: 2023-03-01 | End: 2023-03-02

## 2023-03-01 RX ADMIN — PANTOPRAZOLE SODIUM 40 MILLIGRAM(S): 20 TABLET, DELAYED RELEASE ORAL at 08:37

## 2023-03-01 RX ADMIN — PANTOPRAZOLE SODIUM 10 MG/HR: 20 TABLET, DELAYED RELEASE ORAL at 13:40

## 2023-03-01 RX ADMIN — SODIUM CHLORIDE 75 MILLILITER(S): 9 INJECTION, SOLUTION INTRAVENOUS at 21:34

## 2023-03-01 NOTE — H&P ADULT - PROBLEM SELECTOR PLAN 3
Admission EKG (2/28/23) showed sinus efrain, HR 55, QTc 386, ~0.5-1.5 ?STEs in precordial leads. Denied active CP. Possibly in setting of GIB.  - continue to monitor clinically  - f/u hsTrop Admission EKG (2/28/23) showed sinus efrain, HR 55, QTc 386, ~0.5-1.5 ?STEs in precordial leads. Denied active CP. Possibly related to demand in setting of GIB.  - continue to monitor clinically for active CP  - f/u hsTrop

## 2023-03-01 NOTE — H&P ADULT - PROBLEM SELECTOR PLAN 1
Presented with melanotic stools x5 days. Found to have anemia Hgb 11.9->10.6 and FOBT+. CT A/P w/ IV contrast w/o evidence of acute GIB (only notable for extensive colonic diverticulosis).  - s/p PPI 80mg IV x1 in ED  - c/w PPI 40mg IV BID  - keep NPO for now  - monitor stool and Hgb, transfuse PRN  - f/u GI recs (emailed by ED) Presented with melanotic stools x5 days. On admission, Hgb 11.9->10.6. FOBT+. CT A/P w/ IV contrast w/o evidence of acute GIB (only notable for extensive colonic diverticulosis). Last colonoscopy ~10 yrs ago notable for diverticulosis; never had EGD in the past. Family hx of esophageal cancer in mother. Reports using NSAIDs ~2x/wk. Concern for possible UGIB in setting of NSAID use.  - s/p PPI 80mg IV x1 in ED  - c/w PPI 40mg IV BID  - keep NPO for now  - monitor stool and Hgb, transfuse PRN  - f/u GI recs (emailed by ED)

## 2023-03-01 NOTE — H&P ADULT - NSHPPHYSICALEXAM_GEN_ALL_CORE
Vital Signs Last 24 Hrs  T(C): 36.4 (01 Mar 2023 04:28), Max: 37 (28 Feb 2023 19:02)  T(F): 97.5 (01 Mar 2023 04:28), Max: 98.6 (28 Feb 2023 19:02)  HR: 57 (01 Mar 2023 04:28) (54 - 72)  BP: 152/81 (01 Mar 2023 04:28) (122/79 - 172/100)  BP(mean): --  RR: 18 (01 Mar 2023 04:28) (16 - 18)  SpO2: 97% (01 Mar 2023 04:28) (96% - 99%)    Parameters below as of 01 Mar 2023 04:28  Patient On (Oxygen Delivery Method): room air        CONSTITUTIONAL: NAD, well-developed, well-groomed, laying comfortably in bed  EYES: PERRL; conjunctiva and sclera clear  ENMT: Moist oral mucosa, no pharyngeal exudates  NECK: Supple, no palpable masses  RESPIRATORY: Normal respiratory effort; lungs are clear to auscultation bilaterally; No wheezes or rales  CARDIOVASCULAR: Bradycardic, regular rhythm; Normal S1 and S2; No murmurs, rubs, or gallops; No lower extremity edema; Peripheral pulses are 2+ bilaterally  ABDOMEN: Soft, Nontender, Nondistended; Bowel sounds present  MUSCULOSKELETAL:  No clubbing or cyanosis of digits; No joint swelling or tenderness to palpation  PSYCH: AAOx3 (oriented to person, place, and time); affect appropriate  NEUROLOGY: CN II-XII are intact and symmetric; no gross sensory deficits  SKIN: No rashes; No palpable lesions Vital Signs Last 24 Hrs  T(C): 36.4 (01 Mar 2023 04:28), Max: 37 (28 Feb 2023 19:02)  T(F): 97.5 (01 Mar 2023 04:28), Max: 98.6 (28 Feb 2023 19:02)  HR: 57 (01 Mar 2023 04:28) (54 - 72)  BP: 152/81 (01 Mar 2023 04:28) (122/79 - 172/100)  BP(mean): --  RR: 18 (01 Mar 2023 04:28) (16 - 18)  SpO2: 97% (01 Mar 2023 04:28) (96% - 99%)    Parameters below as of 01 Mar 2023 04:28  Patient On (Oxygen Delivery Method): room air        CONSTITUTIONAL: NAD, well-developed, well-groomed, laying comfortably in bed  EYES: PERRL; conjunctiva and sclera clear  ENMT: Moist oral mucosa, no pharyngeal exudates  NECK: Supple, no palpable masses  RESPIRATORY: Normal respiratory effort; lungs are clear to auscultation bilaterally; No wheezes or rales  CARDIOVASCULAR: Bradycardic, regular rhythm; Normal S1 and S2; No murmurs, rubs, or gallops; No lower extremity edema; Peripheral pulses are 2+ bilaterally  ABDOMEN: Soft, Nontender, Nondistended; Bowel sounds present  RECTAL (per ED): "dark red/maroon stool noted"  MUSCULOSKELETAL:  No clubbing or cyanosis of digits; No joint swelling or tenderness to palpation  PSYCH: AAOx3 (oriented to person, place, and time); affect appropriate  NEUROLOGY: CN II-XII are intact and symmetric; no gross sensory deficits  SKIN: No rashes; No palpable lesions

## 2023-03-01 NOTE — H&P ADULT - NSICDXFAMILYHX_GEN_ALL_CORE_FT
FAMILY HISTORY:  Family history of heart disease  FH: diabetes mellitus    Mother  Still living? Unknown  FHx: esophageal cancer, Age at diagnosis: Age Unknown

## 2023-03-01 NOTE — H&P ADULT - PROBLEM SELECTOR PLAN 6
- DVT ppx: monitor off pharmacological ppx given concern of possible GIB  - Diet: NPO for now  - Dispo: pending GI recs

## 2023-03-01 NOTE — CONSULT NOTE ADULT - ASSESSMENT
# Reported black stools  possibly 2/2 PUD considering NSAID use vs erosive esophagitis/gastritis vs angioectasias vs Dieulafoy vs malignancy  # Esophageal dysphagia to dry solids possibly 2/2 erosive esophagitis, cannot r /o malignancy  # Hematochezia - ALINA with streaks of bright red blood with dark brown stool, possibly 2/2 hemorrhoidal bleeding vs fissure vs diverticular bleeding vs angioectasias vs malignancy  # Constipation  # Anemia  # Fhx esophageal cancer  (mother)    Recommendations:  - Tentatively plan for EGD today pending scheduling  - Keep NPO  - PPI gtt  -      # Reported black stools  possibly 2/2 PUD considering NSAID use vs erosive esophagitis/gastritis vs angioectasias vs Dieulafoy vs malignancy  # Esophageal dysphagia to dry solids possibly 2/2 erosive esophagitis, cannot r /o malignancy  # Hematochezia - ALINA with streaks of bright red blood with dark brown stool, possibly 2/2 hemorrhoidal bleeding vs fissure vs diverticular bleeding vs angioectasias vs malignancy. Last colonoscopy 10+ years ago  # h/o diverticulitis   # Constipation  # Anemia  # Fhx esophageal cancer  (mother)    Recommendations:  - Tentatively plan for EGD today pending scheduling  - Keep NPO  - PPI gtt  - Daily CBC, CMP, coags  - If unable to schedule today, plan for EGD/colon tomorrow  - Rest of care per primary    Preliminary note until signed by Attending.    Thank you for involving us in this patient's care.    Daniella Quach MD  Gastroenterology/Hepatology Fellow, PGY-VI    NON-URGENT CONSULTS:  Please email [email or web address]  AT NIGHT AND ON WEEKENDS:  Contact on-call GI fellow via answering service ([phone]) from 5pm-8am and on weekends/holidays  MONDAY-FRIDAY 8AM-5PM:  Pager# [phone] ([facility])  GI Phone# [phone] ([facility])

## 2023-03-01 NOTE — H&P ADULT - NSHPREVIEWOFSYSTEMS_GEN_ALL_CORE
REVIEW OF SYSTEMS:    CONSTITUTIONAL: +generalized weakness, No fevers or chills  EYES:  No visual changes or eye pain  ENMT: No hearing loss or sore throat  RESPIRATORY: No cough, wheezing, hemoptysis; +SOB  CARDIOVASCULAR: No chest pain or palpitations  GASTROINTESTINAL: +occasional lower abd discomfort/gas pains; +melanotic stools; No epigastric pain; No nausea, vomiting, or hematemesis; No diarrhea or constipation; No hematochezia  GENITOURINARY: No dysuria or hematuria  MUSCULOSKELETAL: +chronic occasional joint pains; No joint swelling; No back pain  NEUROLOGICAL: No numbness or loss of sensation; No loss of strength in extremities  PSYCHIATRIC: No anxiety or depression  SKIN: No itching, burning, rashes, or lesions

## 2023-03-01 NOTE — CHART NOTE - NSCHARTNOTEFT_GEN_A_CORE
Addendum Note - Medicine Attending:  ========================================================    The patient was seen and examined by me today. He is currently asymptomatic. Remains NPO for possible EGD today.   Changed Protonix from push to drip per GI recs.     Pavan Jauregui MD, MOODY  Available on [employer] Teams

## 2023-03-01 NOTE — PATIENT PROFILE ADULT - FALL HARM RISK - HARM RISK INTERVENTIONS

## 2023-03-01 NOTE — H&P ADULT - ASSESSMENT
67M w/ hx of diverticulitis, inguinal hernia s/p repair (2020), L hip replacement, presenting with melanotic stools a/w generalized weakness, fatigue, dyspnea, and occasional lower abd discomfort. Possibly UGIB in setting of NSAID use.   67M w/ hx of diverticulitis, inguinal hernia s/p repair (2020), L hip replacement, presenting with melanotic stools a/w generalized weakness, fatigue, dyspnea, and occasional lower abd discomfort. Concern for possible UGIB in setting of NSAID use.

## 2023-03-01 NOTE — H&P ADULT - NSHPSOCIALHISTORY_GEN_ALL_CORE
Former smoker and EtOH drinker (quit both ~16 yrs ago). Denied using illicit/recreational drugs. Ambulates without assistive devices at baseline. 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

## 2023-03-01 NOTE — H&P ADULT - PROBLEM SELECTOR PLAN 4
On admission, SCr 1.22 (baseline SCr ~0.9-1.0 in 2019). Possibly progression of CKD2 vs mild RAJWINDER in setting of GIB.  - continue to monitor SCr and electrolytes On admission, SCr 1.22, eGFR 65 (baseline SCr ~0.9-1.0 in 2019). Possibly progression of CKD2 vs mild RAJWINDER in setting of GIB.  - continue to monitor SCr and electrolytes

## 2023-03-01 NOTE — CONSULT NOTE ADULT - ATTENDING COMMENTS
GI consulted for dark stools, nausea, intermittent dysphagia to solids only. + fhx of esophageal cancer.   Hgb 11's.    ALINA by GI fellow showing dark brown stool with streaks of red blood.     Discussed role of egd/colonoscopy, patient declines colonoscopy and would like to proceed with EGD only.   States he will do the colonoscopy as an outpatient. R/B reviewed   Will plan for EGD tomorrow.   PPI BID   trend H/H   monitor bowel movements     GI to follow, please call with quesitons.

## 2023-03-01 NOTE — CONSULT NOTE ADULT - SUBJECTIVE AND OBJECTIVE BOX
Chief Complaint:  Patient is a 67y old  Male who presents with a chief complaint of GIB (01 Mar 2023 06:57)      HPI:MONICA GARCIA is a 67y Male    PMHX/PSHX:  Diverticulitis    Hyperlipidemia    No significant past surgical history    History of right hip replacement    History of left hip replacement    History of inguinal hernia repair      Allergies:  [This allergen will not trigger allergy alert] Bee (Anaphylaxis)  atorvastatin (Muscle Pain; Joint Pain)  No Known Drug Allergies      Home Medications: reviewed  Hospital Medications:  acetaminophen     Tablet .. 650 milliGRAM(s) Oral every 6 hours PRN  pantoprazole  Injectable 40 milliGRAM(s) IV Push every 12 hours      Social History:   Tob: Denies  EtOH: Denies  Illicit Drugs: Denies    Family history:  FH: diabetes mellitus    Family history of heart disease    FHx: esophageal cancer (Mother)      Denies family history of colon cancer/polyps, stomach cancer/polyps, pancreatic cancer/masses, liver cancer/disease, ovarian cancer and endometrial cancer.    ROS:   General:  No  fevers, chills, night sweats, fatigue  Eyes:  Good vision, no reported pain  ENT:  No sore throat, pain, runny nose  CV:  No pain, palpitations  Pulm:  No dyspnea, cough  GI:  See HPI, otherwise negative  :  No  incontinence, nocturia  Muscle:  No pain, weakness  Neuro:  No memory problems  Psych:  No insomnia, mood problems, depression  Endocrine:  No polyuria, polydipsia, cold/heat intolerance  Heme:  No petechiae, ecchymosis, easy bruisability  Skin:  No rash    PHYSICAL EXAM:   Vital Signs:  Vital Signs Last 24 Hrs  T(C): 36.4 (01 Mar 2023 04:28), Max: 37 (28 Feb 2023 19:02)  T(F): 97.5 (01 Mar 2023 04:28), Max: 98.6 (28 Feb 2023 19:02)  HR: 57 (01 Mar 2023 04:28) (54 - 72)  BP: 152/81 (01 Mar 2023 04:28) (122/79 - 172/100)  BP(mean): --  RR: 18 (01 Mar 2023 04:28) (16 - 18)  SpO2: 97% (01 Mar 2023 04:28) (96% - 99%)    Parameters below as of 01 Mar 2023 04:28  Patient On (Oxygen Delivery Method): room air      Daily Height in cm: 180.34 (28 Feb 2023 19:02)    Daily     GENERAL: no acute distress  NEURO: alert  HEENT: anicteric sclera, no conjunctival pallor appreciated  CHEST: no respiratory distress, no accessory muscle use  CARDIAC: regular rate, rhythm  ABDOMEN: soft, non-tender, non-distended, no rebound or guarding  ALINA: dark brown stool,   EXTREMITIES: warm, well perfused, no edema  SKIN: no lesions noted    LABS: reviewed                        11.6   4.76  )-----------( 200      ( 01 Mar 2023 08:02 )             34.1     03-01    143  |  107  |  19  ----------------------------<  94  4.1   |  25  |  0.94    Ca    9.3      01 Mar 2023 08:03  Phos  2.8     03-01  Mg     2.0     03-01    TPro  6.9  /  Alb  4.5  /  TBili  0.2  /  DBili  x   /  AST  18  /  ALT  13  /  AlkPhos  67  02-28    LIVER FUNCTIONS - ( 28 Feb 2023 19:42 )  Alb: 4.5 g/dL / Pro: 6.9 g/dL / ALK PHOS: 67 U/L / ALT: 13 U/L / AST: 18 U/L / GGT: x               Diagnostic Studies: see sunrise for full report         Chief Complaint:  Patient is a 67y old  Male who presents with a chief complaint of GIB (01 Mar 2023 06:57)      HPI:MONICA GARCIA is a 67y Male with remote history diverticulitis s/p colonoscopy +10 years ago (unremarkable per patient with outside GI, former smoker, Fhs esophageal cancer (mother), joint pain on advil 3x per day who initially presented to the ED with complaints of 3-5 days of black stools and a little nausea, reported to have maroon colored stool in EGD. GI consulted for melena/hematochezia.     Patient denies any prior episodes of bleeding in the past. Currently no n/v/d. Has constipation intermittently at home and thinks he may have hemorrhoids; notices bright red blood on tissue sometimes with wiping but never mixed with the stool. Also reporting difficulty swallowing dry solids. Says they get stuck in the middle of his esophagus and he has to cough it out or was it down with water. Has been happening a few mos.    He's been HDS and afebrile. Hgb 10. States he cannot stay tomorrow because he has to get to work.    PMHX/PSHX:  Diverticulitis    Hyperlipidemia    No significant past surgical history    History of right hip replacement    History of left hip replacement    History of inguinal hernia repair      Allergies:  [This allergen will not trigger allergy alert] Bee (Anaphylaxis)  atorvastatin (Muscle Pain; Joint Pain)  No Known Drug Allergies      Home Medications: reviewed  Hospital Medications:  acetaminophen     Tablet .. 650 milliGRAM(s) Oral every 6 hours PRN  pantoprazole  Injectable 40 milliGRAM(s) IV Push every 12 hours      Social History:   Tob: former  EtOH: former  Illicit Drugs: Denies    Family history:  FH: diabetes mellitus    Family history of heart disease    FHx: esophageal cancer (Mother)          ROS:   Complete and normal except as mentioned above.    PHYSICAL EXAM:   Vital Signs:  Vital Signs Last 24 Hrs  T(C): 36.4 (01 Mar 2023 04:28), Max: 37 (28 Feb 2023 19:02)  T(F): 97.5 (01 Mar 2023 04:28), Max: 98.6 (28 Feb 2023 19:02)  HR: 57 (01 Mar 2023 04:28) (54 - 72)  BP: 152/81 (01 Mar 2023 04:28) (122/79 - 172/100)  BP(mean): --  RR: 18 (01 Mar 2023 04:28) (16 - 18)  SpO2: 97% (01 Mar 2023 04:28) (96% - 99%)    Parameters below as of 01 Mar 2023 04:28  Patient On (Oxygen Delivery Method): room air      Daily Height in cm: 180.34 (28 Feb 2023 19:02)    Daily     GENERAL: no acute distress  NEURO: alert  HEENT: anicteric sclera, no conjunctival pallor appreciated  CHEST: no respiratory distress, no accessory muscle use  CARDIAC: regular rate, rhythm  ABDOMEN: soft, non-tender, non-distended, no rebound or guarding  ALINA: dark brown stool, streak of bright red blood  EXTREMITIES: warm, well perfused, no edema  SKIN: no lesions noted    LABS: reviewed                        11.6   4.76  )-----------( 200      ( 01 Mar 2023 08:02 )             34.1     03-01    143  |  107  |  19  ----------------------------<  94  4.1   |  25  |  0.94    Ca    9.3      01 Mar 2023 08:03  Phos  2.8     03-01  Mg     2.0     03-01    TPro  6.9  /  Alb  4.5  /  TBili  0.2  /  DBili  x   /  AST  18  /  ALT  13  /  AlkPhos  67  02-28    LIVER FUNCTIONS - ( 28 Feb 2023 19:42 )  Alb: 4.5 g/dL / Pro: 6.9 g/dL / ALK PHOS: 67 U/L / ALT: 13 U/L / AST: 18 U/L / GGT: x               Diagnostic Studies: see sunrise for full report         Chief Complaint:  Patient is a 67y old  Male who presents with a chief complaint of GIB (01 Mar 2023 06:57)      HPI:MONICA GARCIA is a 67y Male with remote history diverticulitis s/p colonoscopy +10 years ago (unremarkable per patient with outside GI, former smoker, Fhs esophageal cancer (mother), joint pain on advil 3x per day who initially presented to the ED with complaints of 3-5 days of black stools and a little nausea.    Patient denies any prior episodes of bleeding in the past. Currently no n/v/d. Has constipation intermittently at home and thinks he may have hemorrhoids; notices bright red blood on tissue sometimes with wiping but never mixed with the stool. Also reporting difficulty swallowing dry solids. Says they get stuck in the middle of his esophagus and he has to cough it out or was it down with water. Has been happening a few mos.    He's been HDS and afebrile. Hgb 10. States he cannot stay tomorrow because he has to get to work.    PMHX/PSHX:  Diverticulitis    Hyperlipidemia    No significant past surgical history    History of right hip replacement    History of left hip replacement    History of inguinal hernia repair      Allergies:  [This allergen will not trigger allergy alert] Bee (Anaphylaxis)  atorvastatin (Muscle Pain; Joint Pain)  No Known Drug Allergies      Home Medications: reviewed  Hospital Medications:  acetaminophen     Tablet .. 650 milliGRAM(s) Oral every 6 hours PRN  pantoprazole  Injectable 40 milliGRAM(s) IV Push every 12 hours      Social History:   Tob: former  EtOH: former  Illicit Drugs: Denies    Family history:  FH: diabetes mellitus    Family history of heart disease    FHx: esophageal cancer (Mother)          ROS:   Complete and normal except as mentioned above.    PHYSICAL EXAM:   Vital Signs:  Vital Signs Last 24 Hrs  T(C): 36.4 (01 Mar 2023 04:28), Max: 37 (28 Feb 2023 19:02)  T(F): 97.5 (01 Mar 2023 04:28), Max: 98.6 (28 Feb 2023 19:02)  HR: 57 (01 Mar 2023 04:28) (54 - 72)  BP: 152/81 (01 Mar 2023 04:28) (122/79 - 172/100)  BP(mean): --  RR: 18 (01 Mar 2023 04:28) (16 - 18)  SpO2: 97% (01 Mar 2023 04:28) (96% - 99%)    Parameters below as of 01 Mar 2023 04:28  Patient On (Oxygen Delivery Method): room air      Daily Height in cm: 180.34 (28 Feb 2023 19:02)    Daily     GENERAL: no acute distress  NEURO: alert  HEENT: anicteric sclera, no conjunctival pallor appreciated  CHEST: no respiratory distress, no accessory muscle use  CARDIAC: regular rate, rhythm  ABDOMEN: soft, non-tender, non-distended, no rebound or guarding  ALINA: dark brown stool, streak of bright red blood  EXTREMITIES: warm, well perfused, no edema  SKIN: no lesions noted    LABS: reviewed                        11.6   4.76  )-----------( 200      ( 01 Mar 2023 08:02 )             34.1     03-01    143  |  107  |  19  ----------------------------<  94  4.1   |  25  |  0.94    Ca    9.3      01 Mar 2023 08:03  Phos  2.8     03-01  Mg     2.0     03-01    TPro  6.9  /  Alb  4.5  /  TBili  0.2  /  DBili  x   /  AST  18  /  ALT  13  /  AlkPhos  67  02-28    LIVER FUNCTIONS - ( 28 Feb 2023 19:42 )  Alb: 4.5 g/dL / Pro: 6.9 g/dL / ALK PHOS: 67 U/L / ALT: 13 U/L / AST: 18 U/L / GGT: x               Diagnostic Studies: see sunrise for full report

## 2023-03-01 NOTE — H&P ADULT - PROBLEM SELECTOR PLAN 2
Presented with melanotic stools a/w generalized weakness, fatigue, and dyspnea. Presented with Hgb 11.9->10.6. Concern for symptomatic anemia.  - continue to monitor CBC  - maintain active T+S  - transfuse PRN for goal Hgb>7 Presented with melanotic stools a/w generalized weakness, fatigue, and dyspnea. Presented with Hgb 11.9->10.6. Concern for symptomatic anemia, possibly in setting of GIB vs CKD-related.  - continue to monitor CBC  - maintain active T+S  - transfuse PRN for goal Hgb>7  - f/u serum iron, TIBC, ferritin, B12, folate, LDH, haptoglobin, retic count

## 2023-03-01 NOTE — H&P ADULT - HISTORY OF PRESENT ILLNESS
67M w/ hx of diverticulitis and inguinal hernia s/p repair (2020), presenting with melanotic stools x5 days. Associated with weakness, fatigue, dyspnea, and occasional lower abd discomfort. Reported episode of diverticulitis ~12 yrs ago. Last colonoscopy was ~10 yrs ago; has not had EGD in the past. Denied being on AC and denied regular NSAID use.    In ED: Afebrile, HR 50s-70s, SBP 120s-170s, RR 16-18, sating 96-99% on RA. Labs notable for Hgb 11.9->10.6. FOBT positive. CT A/P w/ IV con showed no evidence of acute GIB, but notable for extensive colonic diverticulosis. Given PPI 80mg IV x1. Admitted to Medicine for further management. 67M w/ hx of diverticulitis, inguinal hernia s/p repair (2020), L hip replacement, presenting with melanotic stools x5 days. Associated with generalized weakness, fatigue, dyspnea, and occasional lower abd discomfort (attributed to gas pains). Reported eating beets prior to when symptoms started, but when dark stools and symptoms persisted for several days despite not eating any beets, pt came to the hospital. Reported he had an episode of diverticulitis ~12 yrs ago. Last colonoscopy was ~10 yrs ago; has not had EGD in the past. Not on any prescribed medications. Takes NSAIDs ~2x/week for knee pains. Denied use of anticoagulation or steroids. Denied f/c/n/v, CP, dysuria, hematuria, hematochezia, diarrhea, constipation. Noted having borderline high BP in the past but mainly when seeing doctors and had elevated bad cholesterol for which he tried a statin but had significant joint pains and so he stopped taking it and has since changed his diet to be healthier.    In ED: Afebrile, HR 50s-70s, SBP 120s-170s, RR 16-18, sating 96-99% on RA. Labs notable for Hgb 11.9->10.6. FOBT positive. CT A/P w/ IV con showed no evidence of acute GIB, but notable for extensive colonic diverticulosis. Given PPI 80mg IV x1. Admitted to Medicine for further management. 67M w/ hx of diverticulitis, inguinal hernia s/p repair (2020), L hip replacement, presenting with melanotic stools x5 days. Associated with generalized weakness, fatigue, dyspnea, and occasional lower abd discomfort (attributed to gas pains) though these symptoms started prior to noticing the dark stools. Reported eating beets day prior to noticing the dark stools, but when symptoms persisted for several days despite not eating any beets, pt came to the hospital. Never had similar symptoms before. Reported he had an episode of diverticulitis ~12 yrs ago. Last colonoscopy was ~10 yrs ago; has not had EGD in the past. Not on any prescribed medications. Takes NSAIDs ~2x/week for knee pains. Denied use of anticoagulation or steroids. Denied f/c/n/v, CP, dysuria, hematuria, hematochezia, diarrhea, constipation. Otherwise, noted having borderline high BP in the past but mainly when seeing doctors and had elevated bad cholesterol for which he tried a statin but had significant joint pains and so he stopped taking it and has since changed his diet to be healthier.    In ED: Afebrile, HR 50s-70s, SBP 120s-170s, RR 16-18, sating 96-99% on RA. Labs notable for Hgb 11.9->10.6. FOBT positive. CT A/P w/ IV con showed no evidence of acute GIB, but notable for extensive colonic diverticulosis. Given PPI 80mg IV x1. Admitted to Medicine for further management. 67M w/ hx of diverticulitis, inguinal hernia s/p repair (2020), L hip replacement, presenting with melanotic stools x5 days. Associated with generalized weakness, fatigue, dyspnea, and occasional lower abd discomfort (attributed to gas pains) though these symptoms started prior to noticing the dark stools. Reported eating beets day prior to noticing the dark stools, but when symptoms persisted for several days despite not eating any beets, pt came to the hospital. Never had similar symptoms before. Reported he had an episode of diverticulitis ~12 yrs ago. Last colonoscopy was ~10 yrs ago that showed diverticulosis; has not had EGD in the past. Not on any prescribed medications. Takes NSAIDs ~2x/week for knee pains. Denied use of anticoagulation or steroids. Denied f/c/n/v, CP, dysuria, hematuria, hematochezia, diarrhea, constipation. Otherwise, noted having borderline high BP in the past but mainly when seeing doctors and had elevated bad cholesterol for which he tried a statin but had significant joint pains and so he stopped taking it and has since changed his diet to be healthier.    In ED: Afebrile, HR 50s-70s, SBP 120s-170s, RR 16-18, sating 96-99% on RA. Labs notable for Hgb 11.9->10.6. FOBT positive. CT A/P w/ IV con showed no evidence of acute GIB, but notable for extensive colonic diverticulosis. Given PPI 80mg IV x1. Admitted to Medicine for further management.

## 2023-03-01 NOTE — H&P ADULT - NSHPLABSRESULTS_GEN_ALL_CORE
LABS:                        10.6   5.42  )-----------( 188      ( 2023 22:48 )             31.7         142  |  104  |  22  ----------------------------<  90  3.9   |  27  |  1.22    Ca    9.5      2023 19:42    TPro  6.9  /  Alb  4.5  /  TBili  0.2  /  DBili  x   /  AST  18  /  ALT  13  /  AlkPhos  67      PT/INR - ( 2023 19:42 )   PT: 11.7 sec;   INR: 1.02 ratio         PTT - ( 2023 19:42 )  PTT:30.4 sec      Urinalysis Basic - ( 2023 23:25 )    Color: Light Yellow / Appearance: Clear / S.022 / pH: x  Gluc: x / Ketone: Negative  / Bili: Negative / Urobili: Negative   Blood: x / Protein: Trace / Nitrite: Negative   Leuk Esterase: Negative / RBC: 0 /hpf / WBC 0 /HPF   Sq Epi: x / Non Sq Epi: 0 /hpf / Bacteria: Negative            IMAGING/ADDITIONAL TESTS:    EKG (23) personally reviewed: sinus efrain, HR 55, QTc 386, 0.5-1.5 STEs in precordial leads LABS:                        10.6   5.42  )-----------( 188      ( 2023 22:48 )             31.7         142  |  104  |  22  ----------------------------<  90  3.9   |  27  |  1.22    Ca    9.5      2023 19:42    TPro  6.9  /  Alb  4.5  /  TBili  0.2  /  DBili  x   /  AST  18  /  ALT  13  /  AlkPhos  67      PT/INR - ( 2023 19:42 )   PT: 11.7 sec;   INR: 1.02 ratio         PTT - ( 2023 19:42 )  PTT:30.4 sec      Urinalysis Basic - ( 2023 23:25 )    Color: Light Yellow / Appearance: Clear / S.022 / pH: x  Gluc: x / Ketone: Negative  / Bili: Negative / Urobili: Negative   Blood: x / Protein: Trace / Nitrite: Negative   Leuk Esterase: Negative / RBC: 0 /hpf / WBC 0 /HPF   Sq Epi: x / Non Sq Epi: 0 /hpf / Bacteria: Negative            IMAGING/ADDITIONAL TESTS:    EKG (23) personally reviewed: sinus efrain, HR 55, QTc 386, ~0.5-1.5 ?STEs in precordial leads    CXR (23) personally reviewed: grossly clear lungs, no focal consolidations    < from: CT Abdomen and Pelvis w/ IV Cont (23 @ 00:34) >  FINDINGS:  LOWER CHEST: Mild dependent subsegmental atelectasis.    LIVER: Subcentimeter left hepatic hypodensity which is too small to   characterize.  BILE DUCTS: Normal caliber.  GALLBLADDER: Within normal limits.  SPLEEN: Within normal limits.  PANCREAS: Within normal limits.  ADRENALS: Within normal limits.  KIDNEYS/URETERS: Subcentimeter right hypodense focus which is too small   to characterize. No hydronephrosis or nephrolithiasis.    Evaluation of the pelvis is limited secondary to streak artifact from   left hip arthroplasty.    BLADDER: Within normal limits.  REPRODUCTIVE ORGANS: Prostate within normal limits.    BOWEL: No bowel obstruction. Extensive colonic diverticulosis. No   extravasation of contrast to suggest active GI bleeding. Appendix is   normal.  PERITONEUM: No ascites.  VESSELS: Atherosclerotic changes.  RETROPERITONEUM/LYMPH NODES: No lymphadenopathy.  ABDOMINAL WALL: Small fat-containing umbilical hernia.  BONES: Left hip arthroplasty. Degenerative changes.    IMPRESSION:  No evidence of acute GI bleed.    Extensive colonic diverticulosis.  --- End of Report ---  < end of copied text >

## 2023-03-02 ENCOUNTER — TRANSCRIPTION ENCOUNTER (OUTPATIENT)
Age: 68
End: 2023-03-02

## 2023-03-02 ENCOUNTER — RESULT REVIEW (OUTPATIENT)
Age: 68
End: 2023-03-02

## 2023-03-02 VITALS
RESPIRATION RATE: 18 BRPM | TEMPERATURE: 100 F | SYSTOLIC BLOOD PRESSURE: 140 MMHG | OXYGEN SATURATION: 95 % | HEART RATE: 63 BPM | DIASTOLIC BLOOD PRESSURE: 85 MMHG

## 2023-03-02 LAB
ANION GAP SERPL CALC-SCNC: 14 MMOL/L — SIGNIFICANT CHANGE UP (ref 5–17)
APTT BLD: 30.1 SEC — SIGNIFICANT CHANGE UP (ref 27.5–35.5)
BUN SERPL-MCNC: 20 MG/DL — SIGNIFICANT CHANGE UP (ref 7–23)
CALCIUM SERPL-MCNC: 9.4 MG/DL — SIGNIFICANT CHANGE UP (ref 8.4–10.5)
CHLORIDE SERPL-SCNC: 104 MMOL/L — SIGNIFICANT CHANGE UP (ref 96–108)
CO2 SERPL-SCNC: 23 MMOL/L — SIGNIFICANT CHANGE UP (ref 22–31)
CREAT SERPL-MCNC: 0.93 MG/DL — SIGNIFICANT CHANGE UP (ref 0.5–1.3)
EGFR: 91 ML/MIN/1.73M2 — SIGNIFICANT CHANGE UP
GLUCOSE SERPL-MCNC: 78 MG/DL — SIGNIFICANT CHANGE UP (ref 70–99)
HCT VFR BLD CALC: 34.8 % — LOW (ref 39–50)
HGB BLD-MCNC: 11.8 G/DL — LOW (ref 13–17)
INR BLD: 1.09 RATIO — SIGNIFICANT CHANGE UP (ref 0.88–1.16)
MCHC RBC-ENTMCNC: 31.3 PG — SIGNIFICANT CHANGE UP (ref 27–34)
MCHC RBC-ENTMCNC: 33.9 GM/DL — SIGNIFICANT CHANGE UP (ref 32–36)
MCV RBC AUTO: 92.3 FL — SIGNIFICANT CHANGE UP (ref 80–100)
NRBC # BLD: 0 /100 WBCS — SIGNIFICANT CHANGE UP (ref 0–0)
PLATELET # BLD AUTO: 190 K/UL — SIGNIFICANT CHANGE UP (ref 150–400)
POTASSIUM SERPL-MCNC: 4.1 MMOL/L — SIGNIFICANT CHANGE UP (ref 3.5–5.3)
POTASSIUM SERPL-SCNC: 4.1 MMOL/L — SIGNIFICANT CHANGE UP (ref 3.5–5.3)
PROTHROM AB SERPL-ACNC: 12.6 SEC — SIGNIFICANT CHANGE UP (ref 10.5–13.4)
RBC # BLD: 3.77 M/UL — LOW (ref 4.2–5.8)
RBC # FLD: 12.8 % — SIGNIFICANT CHANGE UP (ref 10.3–14.5)
SODIUM SERPL-SCNC: 141 MMOL/L — SIGNIFICANT CHANGE UP (ref 135–145)
WBC # BLD: 4.29 K/UL — SIGNIFICANT CHANGE UP (ref 3.8–10.5)
WBC # FLD AUTO: 4.29 K/UL — SIGNIFICANT CHANGE UP (ref 3.8–10.5)

## 2023-03-02 PROCEDURE — 88305 TISSUE EXAM BY PATHOLOGIST: CPT | Mod: 26

## 2023-03-02 PROCEDURE — 43239 EGD BIOPSY SINGLE/MULTIPLE: CPT | Mod: GC

## 2023-03-02 PROCEDURE — 99239 HOSP IP/OBS DSCHRG MGMT >30: CPT

## 2023-03-02 RX ORDER — PANTOPRAZOLE SODIUM 20 MG/1
1 TABLET, DELAYED RELEASE ORAL
Qty: 60 | Refills: 0
Start: 2023-03-02 | End: 2023-03-31

## 2023-03-02 RX ADMIN — PANTOPRAZOLE SODIUM 10 MG/HR: 20 TABLET, DELAYED RELEASE ORAL at 05:56

## 2023-03-02 NOTE — PROGRESS NOTE ADULT - PROBLEM SELECTOR PLAN 3
Admission EKG (2/28/23) showed sinus efrain, HR 55, QTc 386, ~0.5-1.5 ?STEs in precordial leads. Denied active CP. Possibly related to demand in setting of GIB.  - trop neg

## 2023-03-02 NOTE — DISCHARGE NOTE PROVIDER - NSDCFUADDAPPT_GEN_ALL_CORE_FT
Follow up with gastroenterologist within 5 days of discharge. Follow up with gastroenterologist within 7 days of discharge for EGD biopsy results.  - Will need to take protonix 40mg BID x 8 weeks then daily.

## 2023-03-02 NOTE — DISCHARGE NOTE NURSING/CASE MANAGEMENT/SOCIAL WORK - NSDCFUADDAPPT_GEN_ALL_CORE_FT
Follow up with gastroenterologist within 7 days of discharge for EGD biopsy results.  - Will need to take protonix 40mg BID x 8 weeks then daily.

## 2023-03-02 NOTE — DISCHARGE NOTE PROVIDER - CARE PROVIDER_API CALL
Chidi Ford)  Internal Medicine  [address] [place] [address] [address]  [place] [address]  Phone: [phone]  Fax: [phone]  Follow Up Time: 1 week

## 2023-03-02 NOTE — DISCHARGE NOTE NURSING/CASE MANAGEMENT/SOCIAL WORK - PATIENT PORTAL LINK FT
You can access the FollowMyHealth Patient Portal offered by [facility] by registering at the following website: [email or web address]. By joining [employer]’s FollowMyHealth portal, you will also be able to view your health information using other applications (apps) compatible with our system.

## 2023-03-02 NOTE — DISCHARGE NOTE NURSING/CASE MANAGEMENT/SOCIAL WORK - NSDCPEFALRISK_GEN_ALL_CORE
For information on Fall & Injury Prevention, visit: [email or web address] OR  [email or web address] OR  https://www.cdc.gov/steadi/patient.html

## 2023-03-02 NOTE — DISCHARGE NOTE PROVIDER - NSDCCPCAREPLAN_GEN_ALL_CORE_FT
PRINCIPAL DISCHARGE DIAGNOSIS  Diagnosis: Melena  Assessment and Plan of Treatment: S/p endoscopy. Follow up with colonospy      SECONDARY DISCHARGE DIAGNOSES  Diagnosis: UGIB (upper gastrointestinal bleed)  Assessment and Plan of Treatment: There are 2 common types of GI Bleed, Upper GI Bleed and Lower GI Bleed.  Upper GI Bleed affects the esophagus, stomach, and first part of the small intestine. Lower GI Bleed affects the colon and rectum.  Upper GI Bleed signs and symptoms to notify your Health Care Provider are vomiting blood, or coffee ground vomitus, and bowel movements that look like black tar.  Lower GI Bleed signs and symptoms to notify your health care provider are bright red bloody bowel movements.   Take your medications as prescribed by your Gastroenterologist.  If you have had an Endoscopy or Colonoscopy, follow up with your Gastroenterologist for Pathology results.  Avoid NSAIDs unless your Health Care Provider tells you that it is ok (Aspirin, Ibuprofen, Advil, Motrin, Aleve).  Follow up with your Gastroenterologist within 1-2 weeks of discharge.      Diagnosis: Symptomatic anemia  Assessment and Plan of Treatment:     Diagnosis: HLD (hyperlipidemia)  Assessment and Plan of Treatment:

## 2023-03-02 NOTE — DISCHARGE NOTE NURSING/CASE MANAGEMENT/SOCIAL WORK - NSDPLANG ASIS_GEN_ALL_CORE
Initiate Treatment: Clobetasol scalp solution No Discontinue Regimen: Fluocinonide solution Plan: Fluocinonide solution not as effective as previously used Clobetasol solution. Detail Level: Simple Render In Strict Bullet Format?: Yes

## 2023-03-02 NOTE — PRE PROCEDURE NOTE - PRE PROCEDURE EVALUATION
Pre-Endoscopy Evaluation    Attending Physician: Dr. Ford    Procedure: EGD     Indication for Procedure: Melena    Pertinent History: See [employer] chart    PAST MEDICAL & SURGICAL HISTORY:  Diverticulitis      Hyperlipidemia      History of left hip replacement      History of inguinal hernia repair          Allergies    [This allergen will not trigger allergy alert] Bee (Anaphylaxis)  No Known Drug Allergies    Intolerances    atorvastatin (Muscle Pain; Joint Pain)      Medications: MEDICATIONS  (STANDING):  influenza  Vaccine (HIGH DOSE) 0.7 milliLiter(s) IntraMuscular once  lactated ringers. 1000 milliLiter(s) (75 mL/Hr) IV Continuous <Continuous>  pantoprazole Infusion 8 mG/Hr (10 mL/Hr) IV Continuous <Continuous>    MEDICATIONS  (PRN):  acetaminophen     Tablet .. 650 milliGRAM(s) Oral every 6 hours PRN Temp greater or equal to 38C (100.4F), Mild Pain (1 - 3)      Pertinent lab data:                        11.8   4.29  )-----------( 190      ( 02 Mar 2023 09:27 )             34.8     03-02    141  |  104  |  20  ----------------------------<  78  4.1   |  23  |  0.93    Ca    9.4      02 Mar 2023 09:27  Phos  2.8     03-01  Mg     2.0     03-01    TPro  6.9  /  Alb  4.5  /  TBili  0.2  /  DBili  x   /  AST  18  /  ALT  13  /  AlkPhos  67  02-28    PT/INR - ( 02 Mar 2023 09:27 )   PT: 12.6 sec;   INR: 1.09 ratio         PTT - ( 02 Mar 2023 09:27 )  PTT:30.1 sec            Physical Examination:  Daily     Daily   Vital Signs Last 24 Hrs  T(C): 36.7 (02 Mar 2023 09:56), Max: 36.7 (01 Mar 2023 15:29)  T(F): 98.1 (02 Mar 2023 09:56), Max: 98.1 (02 Mar 2023 09:56)  HR: 55 (02 Mar 2023 09:56) (50 - 56)  BP: 123/78 (02 Mar 2023 09:56) (123/78 - 149/84)  BP(mean): --  RR: 18 (02 Mar 2023 09:56) (18 - 18)  SpO2: 95% (02 Mar 2023 09:56) (95% - 95%)    Parameters below as of 02 Mar 2023 09:56  Patient On (Oxygen Delivery Method): room air      Constitutional: NAD  HEENT: PERRLA, EOMI,    Neck:  No JVD  Respiratory: normal respiratory effort  Cardiovascular: S1 and S2  Gastrointestinal: BS+, soft, NT/ND  Extremities: No peripheral edema  Neurological: A/O x 3, no focal deficits  Psychiatric: Normal mood, normal affect  : No Garcia  Skin: No rashes    Comments:    ASA Class: I [x]  II []  III []  IV []    The patient is a suitable candidate for the planned procedure unless box checked [ ]  No, explain:

## 2023-03-02 NOTE — DISCHARGE NOTE PROVIDER - HOSPITAL COURSE
67M w/ hx of diverticulitis, inguinal hernia s/p repair (2020), L hip replacement, presenting with melanotic stools a/w generalized weakness, fatigue, dyspnea, and occasional lower abd discomfort. Concern for possible UGIB in setting of NSAID use.    >  UGIB (upper gastrointestinal bleed).   ·  Plan: Presented with melanotic stools x5 days. On admission, Hgb 11.9->10.6. FOBT+. CT A/P w/ IV contrast w/o evidence of acute GIB (only notable for extensive colonic diverticulosis). Last colonoscopy ~10 yrs ago notable for diverticulosis; never had EGD in the past. Family hx of esophageal cancer in mother. Reports using NSAIDs ~2x/wk. Concern for possible UGIB in setting of NSAID use.  - s/p PPI 80mg IV  in ED  - c/w PPI 40mg po BID  -Gastroenterology consulted      >  Symptomatic anemia.   ·  Plan: Presented with melanotic stools a/w generalized weakness, fatigue, and dyspnea. Presented with Hgb 11.9->10.6. Concern for symptomatic anemia, possibly in setting of GIB vs CKD-related.  - continue to monitor CBC  - Stable Hgb>7  - f/u serum iron, TIBC, ferritin, B12, folate, LDH, haptoglobin, retic count.    >  Abnormal EKG.   ·  Plan: Admission EKG (2/28/23) showed sinus efrain, HR 55, QTc 386, ~0.5-1.5 ?STEs in precordial leads. Denied active CP. Possibly related to demand in setting of GIB.  - No active CP      >  Stage 2 chronic kidney disease.   ·  Plan: On admission, SCr 1.22, eGFR 65 (baseline SCr ~0.9-1.0 in 2019). Possibly progression of CKD2 vs mild RAJWINDER in setting of GIB.  - continue to monitor SCr and electrolytes.      > HLD (hyperlipidemia).   ·  Plan: Hx of HLD; was unable to tolerate atorvastatin and made dietary changes  -  lipid profile   - Continue home medication    - possible d/c after EGD       67M w/ hx of diverticulitis, inguinal hernia s/p repair (2020), L hip replacement, presenting with melanotic stools a/w generalized weakness, fatigue, dyspnea, and occasional lower abd discomfort. Concern for possible UGIB in setting of NSAID use.    >  UGIB (upper gastrointestinal bleed).   ·  Plan: Presented with melanotic stools x5 days. On admission, Hgb 11.9->10.6. FOBT+. CT A/P w/ IV contrast w/o evidence of acute GIB (only notable for extensive colonic diverticulosis). Last colonoscopy ~10 yrs ago notable for diverticulosis; never had EGD in the past. Family hx of esophageal cancer in mother. Reports using NSAIDs ~2x/wk. Concern for possible UGIB in setting of NSAID use.  - s/p PPI 80mg IV  in ED  - c/w PPI 40mg po BID x 8 weeks then resume daily dosing  -Gastroenterology consulted      >  Symptomatic anemia.   ·  Plan: Presented with melanotic stools a/w generalized weakness, fatigue, and dyspnea. Presented with Hgb 11.9->10.6. Concern for symptomatic anemia, possibly in setting of GIB vs CKD-related.  - continue to monitor CBC  - Stable Hgb>7  - f/u serum iron, TIBC, ferritin, B12, folate, LDH, haptoglobin, retic count        >  Abnormal EKG.   ·  Plan: Admission EKG (2/28/23) showed sinus efrain, HR 55, QTc 386, ~0.5-1.5 ?STEs in precordial leads. Denied active CP. Possibly related to demand in setting of GIB.  - No active CP      >  Stage 2 chronic kidney disease.   ·  Plan: On admission, SCr 1.22, eGFR 65 (baseline SCr ~0.9-1.0 in 2019). Possibly progression of CKD2 vs mild RAJWINDER in setting of GIB.  - continue to monitor SCr and electrolytes.      > HLD (hyperlipidemia).   ·  Plan: Hx of HLD; was unable to tolerate atorvastatin and made dietary changes  -  lipid profile   - Continue home medication    -  EGD results    Impression:          - Z-line regular.                       - 3 cm hiatal hernia.                       - Gastroesophageal flap valve classified as Hill Grade II (fold present,                        opens with respiration).                       - Superficial non-bleeding gastric ulcers with a clean ulcer base (Luis                        Class III), likely source of melena.                       - No gross esophageal findings to explain dysphagia.                       - Esophageal biopsies taken to evaluate for other potential causes including                        EoE.                       - Normal duodenal bulb and second portion of the duodenum.  Recommendation:      - Return patient to hospital reich for ongoing care.                       - Resume regular diet today.                       - Use Protonix (pantoprazole) 40 mg PO BID for 8 weeks and then switch to                        qdaily dosing.                       - Await pathology results                       - Outpatient GI follow up  Cleared by Dr Fan for discharge home

## 2023-03-02 NOTE — PROGRESS NOTE ADULT - PROBLEM SELECTOR PLAN 6
- DVT ppx: monitor off pharmacological ppx given concern of possible GIB  - Diet: NPO for now  - Dispo: pending EGD results - can discharge home today if no concerning findings and GI clears. Patient has no outpatient GI doctor - give clinic information on discharge. Will likely need PPI   d/w patient     Time spent on discharge: 40 min

## 2023-03-02 NOTE — PROGRESS NOTE ADULT - SUBJECTIVE AND OBJECTIVE BOX
[facility] Division of Hospital Medicine  Judith MengDO gricelda   Available via [employer] Teams      Patient is a 67y old  Male who presents with a chief complaint of GIB (02 Mar 2023 10:47)      SUBJECTIVE / OVERNIGHT EVENTS:  No melena. Patient feels dehydrated but no abd pain, N/V, abd pain     MEDICATIONS  (STANDING):  influenza  Vaccine (HIGH DOSE) 0.7 milliLiter(s) IntraMuscular once  lactated ringers. 1000 milliLiter(s) (75 mL/Hr) IV Continuous <Continuous>  pantoprazole Infusion 8 mG/Hr (10 mL/Hr) IV Continuous <Continuous>    MEDICATIONS  (PRN):  acetaminophen     Tablet .. 650 milliGRAM(s) Oral every 6 hours PRN Temp greater or equal to 38C (100.4F), Mild Pain (1 - 3)      CAPILLARY BLOOD GLUCOSE        I&O's Summary    01 Mar 2023 07:01  -  02 Mar 2023 07:00  --------------------------------------------------------  IN: 0 mL / OUT: 200 mL / NET: -200 mL        PHYSICAL EXAM:  Vital Signs Last 24 Hrs  T(C): 37.1 (02 Mar 2023 14:46), Max: 37.1 (02 Mar 2023 14:46)  T(F): 98.8 (02 Mar 2023 14:46), Max: 98.8 (02 Mar 2023 14:46)  HR: 60 (02 Mar 2023 15:25) (55 - 64)  BP: 119/70 (02 Mar 2023 15:25) (119/70 - 168/83)  BP(mean): --  RR: 20 (02 Mar 2023 15:25) (18 - 20)  SpO2: 100% (02 Mar 2023 15:25) (95% - 100%)    Parameters below as of 02 Mar 2023 15:25  Patient On (Oxygen Delivery Method): room air      CONSTITUTIONAL: NAD, well-developed, well-groomed  EYES: conjunctiva and sclera clear  ENMT: Moist oral mucosa  RESPIRATORY: Normal respiratory effort; lungs are clear to auscultation bilaterally  CARDIOVASCULAR: bradycardia, normal S1 and S2; No lower extremity edema  ABDOMEN: Nontender to palpation, normoactive bowel sounds, no rebound/guarding  MUSCULOSKELETAL: no clubbing or cyanosis of digits; no joint swelling or tenderness to palpation  PSYCH: A+O to person, place, and time; affect appropriate  SKIN: No rashes; no palpable lesions    LABS:                        11.8   4.29  )-----------( 190      ( 02 Mar 2023 09:27 )             34.8     03-02    141  |  104  |  20  ----------------------------<  78  4.1   |  23  |  0.93    Ca    9.4      02 Mar 2023 09:27  Phos  2.8     03-01  Mg     2.0     03-01    TPro  6.9  /  Alb  4.5  /  TBili  0.2  /  DBili  x   /  AST  18  /  ALT  13  /  AlkPhos  67      PT/INR - ( 02 Mar 2023 09:27 )   PT: 12.6 sec;   INR: 1.09 ratio         PTT - ( 02 Mar 2023 09:27 )  PTT:30.1 sec      Urinalysis Basic - ( 2023 23:25 )    Color: Light Yellow / Appearance: Clear / S.022 / pH: x  Gluc: x / Ketone: Negative  / Bili: Negative / Urobili: Negative   Blood: x / Protein: Trace / Nitrite: Negative   Leuk Esterase: Negative / RBC: 0 /hpf / WBC 0 /HPF   Sq Epi: x / Non Sq Epi: 0 /hpf / Bacteria: Negative          RADIOLOGY & ADDITIONAL TESTS:  Results Reviewed:   Imaging Personally Reviewed:  Electrocardiogram Personally Reviewed:    COORDINATION OF CARE:  Care Discussed with Consultants/Other Providers [Y/N]:  Prior or Outpatient Records Reviewed [Y/N]:

## 2023-03-02 NOTE — PROGRESS NOTE ADULT - PROBLEM SELECTOR PLAN 4
On admission, SCr 1.22, eGFR 65 (baseline SCr ~0.9-1.0 in 2019). Possibly progression of CKD2 vs mild RAJWINDER in setting of GIB.

## 2023-03-02 NOTE — PROGRESS NOTE ADULT - PROBLEM SELECTOR PLAN 2
Presented with melanotic stools a/w generalized weakness, fatigue, and dyspnea. Presented with Hgb 11.9->10.6. Concern for symptomatic anemia, possibly in setting of GIB vs CKD-related.  - cbc stable   - maintain active T+S  - transfuse PRN for goal Hgb>7  - iron level normal

## 2023-03-02 NOTE — DISCHARGE NOTE PROVIDER - NSDCMRMEDTOKEN_GEN_ALL_CORE_FT
OTC NSAIDs: 1 tab(s) orally ~2 times a week, As Needed   Protonix 40 mg oral delayed release tablet: 1 tab(s) orally 2 times a day

## 2023-03-02 NOTE — PROGRESS NOTE ADULT - PROBLEM SELECTOR PLAN 1
[facility] Internal Medicine Progress Note    CC:  Patient presents with:  Medication Follow-Up: pt states ranitidine is making symptoms worse      HPI:   HPI  At last OV we started zantac, she started with 2 tabs daily and was having stomach flakito abdominal pain. Negative for blood in stool, constipation, diarrhea, nausea and vomiting. Neurological: Negative for dizziness, light-headedness and headaches.          /70   Pulse 74   Resp 16   Ht 65\"   Wt 127 lb   BMI 21.13 kg/m²  Body mass inde Visit:  Requested Prescriptions     Signed Prescriptions Disp Refills   • Omeprazole 20 MG Oral Tab EC 30 tablet 1     Sig: Take 20 mg by mouth daily.        Imaging & Consults:  None     Patient/Caregiver Education: Patient/Caregiver Education: There are n Presented with melanotic stools x5 days. On admission, Hgb 11.9->10.6. FOBT+. CT A/P w/ IV contrast w/o evidence of acute GIB (only notable for extensive colonic diverticulosis). Last colonoscopy ~10 yrs ago notable for diverticulosis; never had EGD in the past. Family hx of esophageal cancer in mother. Reports using NSAIDs ~2x/wk. Concern for possible UGIB in setting of NSAID use.  - s/p PPI 80mg IV x1 in ED  - c/w PPI 40mg IV BID  - EGD today  - Patient states will follow up outpatient for colonoscopy - provide clinic information on discharge. No outpatient provider

## 2023-03-03 PROCEDURE — 71045 X-RAY EXAM CHEST 1 VIEW: CPT

## 2023-03-03 PROCEDURE — 88305 TISSUE EXAM BY PATHOLOGIST: CPT

## 2023-03-03 PROCEDURE — 85018 HEMOGLOBIN: CPT

## 2023-03-03 PROCEDURE — 84132 ASSAY OF SERUM POTASSIUM: CPT

## 2023-03-03 PROCEDURE — 80048 BASIC METABOLIC PNL TOTAL CA: CPT

## 2023-03-03 PROCEDURE — 82330 ASSAY OF CALCIUM: CPT

## 2023-03-03 PROCEDURE — 84100 ASSAY OF PHOSPHORUS: CPT

## 2023-03-03 PROCEDURE — 82272 OCCULT BLD FECES 1-3 TESTS: CPT

## 2023-03-03 PROCEDURE — 83540 ASSAY OF IRON: CPT

## 2023-03-03 PROCEDURE — 83735 ASSAY OF MAGNESIUM: CPT

## 2023-03-03 PROCEDURE — 80053 COMPREHEN METABOLIC PANEL: CPT

## 2023-03-03 PROCEDURE — 82947 ASSAY GLUCOSE BLOOD QUANT: CPT

## 2023-03-03 PROCEDURE — 86901 BLOOD TYPING SEROLOGIC RH(D): CPT

## 2023-03-03 PROCEDURE — 85014 HEMATOCRIT: CPT

## 2023-03-03 PROCEDURE — 82803 BLOOD GASES ANY COMBINATION: CPT

## 2023-03-03 PROCEDURE — 85730 THROMBOPLASTIN TIME PARTIAL: CPT

## 2023-03-03 PROCEDURE — 81001 URINALYSIS AUTO W/SCOPE: CPT

## 2023-03-03 PROCEDURE — 84295 ASSAY OF SERUM SODIUM: CPT

## 2023-03-03 PROCEDURE — 87637 SARSCOV2&INF A&B&RSV AMP PRB: CPT

## 2023-03-03 PROCEDURE — 85610 PROTHROMBIN TIME: CPT

## 2023-03-03 PROCEDURE — 83605 ASSAY OF LACTIC ACID: CPT

## 2023-03-03 PROCEDURE — 86900 BLOOD TYPING SEROLOGIC ABO: CPT

## 2023-03-03 PROCEDURE — 85025 COMPLETE CBC W/AUTO DIFF WBC: CPT

## 2023-03-03 PROCEDURE — 82435 ASSAY OF BLOOD CHLORIDE: CPT

## 2023-03-03 PROCEDURE — 83550 IRON BINDING TEST: CPT

## 2023-03-03 PROCEDURE — 83690 ASSAY OF LIPASE: CPT

## 2023-03-03 PROCEDURE — 86850 RBC ANTIBODY SCREEN: CPT

## 2023-03-03 PROCEDURE — 83036 HEMOGLOBIN GLYCOSYLATED A1C: CPT

## 2023-03-03 PROCEDURE — 74177 CT ABD & PELVIS W/CONTRAST: CPT | Mod: MA

## 2023-03-03 PROCEDURE — 99285 EMERGENCY DEPT VISIT HI MDM: CPT

## 2023-03-03 PROCEDURE — 84484 ASSAY OF TROPONIN QUANT: CPT

## 2023-03-03 PROCEDURE — 80061 LIPID PANEL: CPT

## 2023-03-03 PROCEDURE — 96374 THER/PROPH/DIAG INJ IV PUSH: CPT

## 2023-03-03 PROCEDURE — 85027 COMPLETE CBC AUTOMATED: CPT

## 2023-03-05 NOTE — PRE-OP CHECKLIST - AICD PRESENT
Vital Signs Last 24 Hrs  T(C): 36.7 (04 Mar 2023 20:52), Max: 36.7 (04 Mar 2023 20:52)  T(F): 98 (04 Mar 2023 20:52), Max: 98 (04 Mar 2023 20:52)  HR: 72 (05 Mar 2023 00:15) (72 - 89)  BP: 149/83 (05 Mar 2023 00:15) (143/93 - 200/117)  BP(mean): --  RR: 17 (05 Mar 2023 00:15) (16 - 25)  SpO2: 100% (05 Mar 2023 00:15) (97% - 100%)    Parameters below as of 05 Mar 2023 00:15  Patient On (Oxygen Delivery Method): room air    ========================================================================================== no

## 2023-03-06 LAB — SURGICAL PATHOLOGY STUDY: SIGNIFICANT CHANGE UP

## 2023-04-03 ENCOUNTER — NON-APPOINTMENT (OUTPATIENT)
Age: 68
End: 2023-04-03

## 2023-04-24 ENCOUNTER — APPOINTMENT (OUTPATIENT)
Dept: GASTROENTEROLOGY | Facility: CLINIC | Age: 68
End: 2023-04-24

## 2023-05-05 ENCOUNTER — NON-APPOINTMENT (OUTPATIENT)
Age: 68
End: 2023-05-05

## 2023-07-19 NOTE — ED ADULT NURSE NOTE - HAVE YOU HAD COVID IN THE LAST 60 DAYS?
In responding to this request, please exercise your independent professional judgment.  The fact that a question is asked does not imply that any particular answer is desired or expected. No

## 2023-09-15 ASSESSMENT — HOOS JR
IMPORTED HOOS JR SCORE: 76.78
LYING IN BED (TURNING OVER, MAINTAINING HIP POSITION): MILD
SITTING: MODERATE
WALKING ON UNEVEN SURFACE: MILD
RISING FROM SITTING: MILD
IMPORTED HOOS JR SCORE: 61.81
LYING IN BED (TURNING OVER, MAINTAINING HIP POSITION): MODERATE
IMPORTED FORM: YES
IMPORTED HOOS JR SCORE: 92.34
HOOS JR RAW SCORE: 9
SITTING: MILD
WALKING ON UNEVEN SURFACE: MODERATE
BENDING TO THE FLOOR TO PICK UP OBJECT: MILD
IMPORTED LATERALITY: LEFT
GOING UP OR DOWN STAIRS: MILD
HOOS JR RAW SCORE: 4
HOOS JR RAW SCORE: 1

## 2024-03-06 ENCOUNTER — APPOINTMENT (OUTPATIENT)
Dept: GERIATRICS | Facility: CLINIC | Age: 69
End: 2024-03-06
Payer: MEDICARE

## 2024-03-06 VITALS
TEMPERATURE: 99.1 F | HEIGHT: 71 IN | DIASTOLIC BLOOD PRESSURE: 95 MMHG | SYSTOLIC BLOOD PRESSURE: 155 MMHG | OXYGEN SATURATION: 93 % | RESPIRATION RATE: 18 BRPM | BODY MASS INDEX: 20.3 KG/M2 | WEIGHT: 145 LBS | HEART RATE: 65 BPM

## 2024-03-06 DIAGNOSIS — Z23 ENCOUNTER FOR IMMUNIZATION: ICD-10-CM

## 2024-03-06 PROCEDURE — 99204 OFFICE O/P NEW MOD 45 MIN: CPT

## 2024-03-06 RX ORDER — MELOXICAM 15 MG/1
15 TABLET ORAL DAILY
Qty: 30 | Refills: 0 | Status: COMPLETED | COMMUNITY
Start: 2022-09-22 | End: 2024-03-06

## 2024-03-06 NOTE — REASON FOR VISIT
[Initial Evaluation] : an initial evaluation [FreeTextEntry1] : recent hospitalization for diverticulitis

## 2024-03-06 NOTE — ASSESSMENT
[FreeTextEntry1] : diverticulosis: recent hospitalization for diverticuliitis end of feb 202 will obtain records with imaging was on telemetry - he wasn't informed of any changes f/u GI  htn: ekg in hospital- need records start losartan 25mg daily f/u 2weeks for bp check and will order labwork afterwards after reviewing hospital labwork  hx of HLD: f/u hospital labs hx of HLD didn't tolerate statin- very active consider cac score   HCM: screening for AAA, screening of carotids pending what imaging was obtained during hospitalization  Dr Holliday records -prior imaging, EKG, and last consult note   f/u 2 weeks

## 2024-03-06 NOTE — PHYSICAL EXAM
[Version 1] : Word list version 1 - Banana, Sunrise, Chair [2 Words (2 points)] : 2 words (2 points) [Normal Clock Drawn, with correct arm position (2 points)] : normal clock drawn, with correct arm position (2 points) [4 Points] : 4 points [Sclera] : the sclera and conjunctiva were normal [EOMI] : extraocular movements were intact [Normal Outer Ear/Nose] : the ears and nose were normal in appearance [Normal Appearance] : the appearance of the neck was normal [Normal S1, S2] : normal S1 and S2 [Murmurs] : no murmurs [Heart Rate And Rhythm] : heart rate was normal and rhythm regular [Bowel Sounds] : normal bowel sounds [Edema] : edema was not present [No Masses] : no abdominal mass palpated [Abdomen Tenderness] : non-tender [Abdomen Soft] : soft [No Clubbing, Cyanosis] : no clubbing or cyanosis of the fingernails [Involuntary Movements] : no involuntary movements were seen [Normal] : normal affect and normal mood [No Focal Deficits] : no focal deficits [Normal Color / Pigmentation] : normal skin color and pigmentation [de-identified] : no kyphosis

## 2024-03-06 NOTE — HISTORY OF PRESENT ILLNESS
[Patient reported Patient reported dental screening is normal] : Patient reported dental screening is normal [Patient reported hearing was normal] : Patient reported hearing was normal [Completely Independent] : Completely independent. [0] : 2) Feeling down, depressed, or hopeless: Not at all (0) [PHQ-2 Negative - No further assessment needed] : PHQ-2 Negative - No further assessment needed [FreeTextEntry1] : 68yoM with diverticulosis seen as new patient. hospitalized 2/25/24 for diverticulitis at [facility] in [place]. pending Gi follow up tomorrow denies diarrhea or ab pain.  he has completed his abx course post discharge has been eating pasta without problems.  also with concern about elevated blood pressure borderline high in 2018 hx of hld: had blood work at hospitalization.  blood work 2018  hx of  muscle pain with statin and he did not take family hx of dm2 and stroke denies chest pain, palpitations, or sob.   joint pain: takes two supplements daily lipidene  lost about 10lbs over last 1 year very active  works as dry clean delivery  sleep is okay  former smoker quit 20 years ago no etoh has 2 children 1 dtr and son   lives with roomate his best friend  HCP: maren Mueller [phone]  declines all vaccinations [TextBox_37] : reading glasses

## 2024-03-20 ENCOUNTER — APPOINTMENT (OUTPATIENT)
Dept: GERIATRICS | Facility: CLINIC | Age: 69
End: 2024-03-20
Payer: MEDICARE

## 2024-03-20 VITALS
DIASTOLIC BLOOD PRESSURE: 84 MMHG | RESPIRATION RATE: 14 BRPM | WEIGHT: 146 LBS | OXYGEN SATURATION: 98 % | HEIGHT: 71 IN | SYSTOLIC BLOOD PRESSURE: 120 MMHG | BODY MASS INDEX: 20.44 KG/M2 | HEART RATE: 54 BPM | TEMPERATURE: 99.8 F

## 2024-03-20 DIAGNOSIS — I51.7 CARDIOMEGALY: ICD-10-CM

## 2024-03-20 DIAGNOSIS — R94.31 ABNORMAL ELECTROCARDIOGRAM [ECG] [EKG]: ICD-10-CM

## 2024-03-20 DIAGNOSIS — I10 ESSENTIAL (PRIMARY) HYPERTENSION: ICD-10-CM

## 2024-03-20 DIAGNOSIS — K57.90 DIVERTICULOSIS OF INTESTINE, PART UNSPECIFIED, W/OUT PERFORATION OR ABSCESS W/OUT BLEEDING: ICD-10-CM

## 2024-03-20 PROCEDURE — 99213 OFFICE O/P EST LOW 20 MIN: CPT

## 2024-03-20 NOTE — HISTORY OF PRESENT ILLNESS
[FreeTextEntry1] : 68yom seen for follow up for HTN.  last visit was started on losartan 25mg daily has been adherent, denies side effects bp today is improved  hospital records reviewed from end of feb with acute sigmoid diverticulitis wbc 12.64 no anemia cr 0.99 CXR : NAD CT abpelv: sigmoid divertiuclitis  EKG showing sinus efrain, LVH, and ST elevations in v4,v5,v6 negative troponins. he was seen by cards, but do not have consult notes for review. cholesterol tchol 228  tg 70  HDL 61   denies chest pain, sob.  he is very active  he reports he is going for colonoscopy  ED: states he occasionally takes cialis 20mg

## 2024-03-20 NOTE — ASSESSMENT
[FreeTextEntry1] : HTN: controlled continue home log c/w losartan 25mg daily  hospital records reviewed from feb 2024: with abnormal EKG findings sinus efrain, LVH, and ST elevations.   he is without current sytmpoms recommend cardiology referral reports hx of unable to tolerate statins due to muscle pain and joint pains. discussed diet changes  diverticulosis with recent sigmoid diverticulitis hospitalization pending colonoscopy with GI 4/4/24

## 2024-03-20 NOTE — PHYSICAL EXAM
[Normal] : alert, in no acute distress [Sclera] : the sclera and conjunctiva were normal [EOMI] : extraocular movements were intact [No Respiratory Distress] : no respiratory distress [No Acc Muscle Use] : no accessory muscle use [Respiration, Rhythm And Depth] : normal respiratory rhythm and effort [No Clubbing, Cyanosis] : no clubbing or cyanosis of the fingernails [Involuntary Movements] : no involuntary movements were seen [Normal Affect] : the affect was normal [Normal Color / Pigmentation] : normal skin color and pigmentation [Normal Mood] : the mood was normal

## 2024-03-27 ENCOUNTER — APPOINTMENT (OUTPATIENT)
Dept: GERIATRICS | Facility: CLINIC | Age: 69
End: 2024-03-27
Payer: MEDICARE

## 2024-03-27 VITALS
BODY MASS INDEX: 21 KG/M2 | TEMPERATURE: 97.8 F | DIASTOLIC BLOOD PRESSURE: 84 MMHG | HEIGHT: 71 IN | SYSTOLIC BLOOD PRESSURE: 132 MMHG | OXYGEN SATURATION: 98 % | HEART RATE: 54 BPM | WEIGHT: 150 LBS | RESPIRATION RATE: 16 BRPM

## 2024-03-27 DIAGNOSIS — H66.90 OTITIS MEDIA, UNSPECIFIED, UNSPECIFIED EAR: ICD-10-CM

## 2024-03-27 PROCEDURE — 99213 OFFICE O/P EST LOW 20 MIN: CPT

## 2024-03-27 RX ORDER — AMOXICILLIN AND CLAVULANATE POTASSIUM 500; 125 MG/1; MG/1
500-125 TABLET, FILM COATED ORAL
Qty: 14 | Refills: 0 | Status: ACTIVE | COMMUNITY
Start: 2024-03-27 | End: 1900-01-01

## 2024-03-27 NOTE — PHYSICAL EXAM
[Alert] : alert [No Acute Distress] : in no acute distress [Sclera] : the sclera and conjunctiva were normal [Normal Appearance] : the appearance of the neck was normal [EOMI] : extraocular movements were intact [Supple] : the neck was supple [No Respiratory Distress] : no respiratory distress [Respiration, Rhythm And Depth] : normal respiratory rhythm and effort [No Acc Muscle Use] : no accessory muscle use [de-identified] : right ear canal with erythema and dried blood, TM with opacity, left ear canal wnl

## 2024-03-27 NOTE — HISTORY OF PRESENT ILLNESS
[FreeTextEntry1] : 68yoM seen for acute vsiit right right ear pain x 2 days states he had hx of infection in ear in past bc he uses q=tips  he started using abx drops  today. he reports pain in the right ear and around it. denies fever or chills started new invisaline and is biting more at night. took advil today hx of ulcer with bleed

## 2024-03-27 NOTE — ASSESSMENT
[FreeTextEntry1] : OM: patient does not have pcn allergy- start augmentin advised to stop external drops stop q-tips if not improving advised ENT follow up. pain control with tylenol ES would avoid nsaid given hx of ulcer

## 2024-04-11 ENCOUNTER — APPOINTMENT (OUTPATIENT)
Dept: CARDIOLOGY | Facility: CLINIC | Age: 69
End: 2024-04-11

## 2024-05-24 NOTE — ED ADULT TRIAGE NOTE - MEANS OF ARRIVAL
Dulera is not covered by ins  please review and change to covered inhaler if appropriate or advise why not      Covered inhalers: Advair HFA, Breo Ellipta, Symbicort.    ambulatory

## 2024-06-06 RX ORDER — LOSARTAN POTASSIUM 25 MG/1
25 TABLET, FILM COATED ORAL
Qty: 90 | Refills: 2 | Status: ACTIVE | COMMUNITY
Start: 2024-03-06 | End: 1900-01-01

## 2024-10-29 NOTE — PHYSICAL EXAM
[de-identified] : Patient is well nourished, well-developed, in no acute distress, with appropriate mood and affect. The patient is oriented to time, place, and person. Respirations are even and unlabored. Gait evaluation reveals a limp. There is no inguinal adenopathy. Examination of the contralateral hip shows normal range of motion, strength, no tenderness, and intact skin. The affected limb is well-perfused, shows a grossly normal motor and sensory examination. Examination of the hip shows no skin lesions. Hip motion is reduced and causes pain. Leg lengths are approximately 1 cm short on the left. Stinchfield test is positive. Both hips are stable and muscle strength is normal. Pedal pulses are palpable.\par  [de-identified] : AP and lateral x-rays of the left hip, pelvis, and femur were reviewed from the prior visit and demonstrate degenerative joint disease of the hip with joint space narrowing, osteophyte formation, and subchondral sclerosis.\par  130

## 2025-01-07 NOTE — H&P PST ADULT - NSWEIGHTCALCTOOLDRUG_GEN_A_CORE
PAST MEDICAL HISTORY:  BPH (benign prostatic hyperplasia)     CAD (coronary artery disease)     Cirrhosis     HLD (hyperlipidemia)     HTN (hypertension)     T2DM (type 2 diabetes mellitus)       used

## 2025-02-24 ENCOUNTER — APPOINTMENT (OUTPATIENT)
Dept: FAMILY MEDICINE | Facility: CLINIC | Age: 70
End: 2025-02-24
Payer: MEDICARE

## 2025-02-24 VITALS
DIASTOLIC BLOOD PRESSURE: 82 MMHG | HEART RATE: 57 BPM | WEIGHT: 150 LBS | TEMPERATURE: 97.4 F | SYSTOLIC BLOOD PRESSURE: 144 MMHG | RESPIRATION RATE: 16 BRPM | BODY MASS INDEX: 21 KG/M2 | HEIGHT: 71 IN | OXYGEN SATURATION: 97 %

## 2025-02-24 DIAGNOSIS — Z82.49 FAMILY HISTORY OF ISCHEMIC HEART DISEASE AND OTHER DISEASES OF THE CIRCULATORY SYSTEM: ICD-10-CM

## 2025-02-24 DIAGNOSIS — Z76.89 PERSONS ENCOUNTERING HEALTH SERVICES IN OTHER SPECIFIED CIRCUMSTANCES: ICD-10-CM

## 2025-02-24 DIAGNOSIS — Z13.29 ENCOUNTER FOR SCREENING FOR OTHER SUSPECTED ENDOCRINE DISORDER: ICD-10-CM

## 2025-02-24 DIAGNOSIS — Z12.5 ENCOUNTER FOR SCREENING FOR MALIGNANT NEOPLASM OF PROSTATE: ICD-10-CM

## 2025-02-24 DIAGNOSIS — R94.31 ABNORMAL ELECTROCARDIOGRAM [ECG] [EKG]: ICD-10-CM

## 2025-02-24 DIAGNOSIS — I10 ESSENTIAL (PRIMARY) HYPERTENSION: ICD-10-CM

## 2025-02-24 DIAGNOSIS — Z13.21 ENCOUNTER FOR SCREENING FOR NUTRITIONAL DISORDER: ICD-10-CM

## 2025-02-24 PROCEDURE — 99204 OFFICE O/P NEW MOD 45 MIN: CPT

## 2025-02-27 LAB
ALBUMIN SERPL ELPH-MCNC: 4.7 G/DL
ALP BLD-CCNC: 73 U/L
ALT SERPL-CCNC: 23 U/L
ANION GAP SERPL CALC-SCNC: 12 MMOL/L
APPEARANCE: CLEAR
AST SERPL-CCNC: 21 U/L
BACTERIA: NEGATIVE /HPF
BASOPHILS # BLD AUTO: 0.05 K/UL
BASOPHILS NFR BLD AUTO: 0.8 %
BILIRUB SERPL-MCNC: 0.4 MG/DL
BILIRUBIN URINE: NEGATIVE
BLOOD URINE: NEGATIVE
BUN SERPL-MCNC: 15 MG/DL
CALCIUM SERPL-MCNC: 9.7 MG/DL
CAST: 0 /LPF
CHLORIDE SERPL-SCNC: 103 MMOL/L
CHOLEST SERPL-MCNC: 239 MG/DL
CO2 SERPL-SCNC: 29 MMOL/L
COLOR: YELLOW
CREAT SERPL-MCNC: 0.93 MG/DL
EGFR: 89 ML/MIN/1.73M2
EOSINOPHIL # BLD AUTO: 0.12 K/UL
EOSINOPHIL NFR BLD AUTO: 2 %
EPITHELIAL CELLS: 0 /HPF
ESTIMATED AVERAGE GLUCOSE: 108 MG/DL
GLUCOSE QUALITATIVE U: NEGATIVE MG/DL
GLUCOSE SERPL-MCNC: 92 MG/DL
HBA1C MFR BLD HPLC: 5.4 %
HCT VFR BLD CALC: 41.1 %
HDLC SERPL-MCNC: 60 MG/DL
HGB BLD-MCNC: 14 G/DL
IMM GRANULOCYTES NFR BLD AUTO: 0.2 %
KETONES URINE: NEGATIVE MG/DL
LDLC SERPL CALC-MCNC: 170 MG/DL
LEUKOCYTE ESTERASE URINE: NEGATIVE
LYMPHOCYTES # BLD AUTO: 1.71 K/UL
LYMPHOCYTES NFR BLD AUTO: 28 %
MAN DIFF?: NORMAL
MCHC RBC-ENTMCNC: 31.7 PG
MCHC RBC-ENTMCNC: 34.1 G/DL
MCV RBC AUTO: 93.2 FL
MICROSCOPIC-UA: NORMAL
MONOCYTES # BLD AUTO: 0.48 K/UL
MONOCYTES NFR BLD AUTO: 7.9 %
NEUTROPHILS # BLD AUTO: 3.73 K/UL
NEUTROPHILS NFR BLD AUTO: 61.1 %
NITRITE URINE: NEGATIVE
NONHDLC SERPL-MCNC: 178 MG/DL
PH URINE: 6
PLATELET # BLD AUTO: 214 K/UL
POTASSIUM SERPL-SCNC: 4.6 MMOL/L
PROT SERPL-MCNC: 7.2 G/DL
PROTEIN URINE: NEGATIVE MG/DL
PSA FREE FLD-MCNC: 31 %
PSA FREE SERPL-MCNC: 0.2 NG/ML
PSA SERPL-MCNC: 0.64 NG/ML
RBC # BLD: 4.41 M/UL
RBC # FLD: 13.2 %
RED BLOOD CELLS URINE: 1 /HPF
SODIUM SERPL-SCNC: 144 MMOL/L
SPECIFIC GRAVITY URINE: 1.02
TRIGL SERPL-MCNC: 51 MG/DL
TSH SERPL-ACNC: 1.29 UIU/ML
UROBILINOGEN URINE: 0.2 MG/DL
WBC # FLD AUTO: 6.1 K/UL
WHITE BLOOD CELLS URINE: 0 /HPF

## 2025-03-03 DIAGNOSIS — E78.5 HYPERLIPIDEMIA, UNSPECIFIED: ICD-10-CM

## 2025-03-03 RX ORDER — PRAVASTATIN SODIUM 10 MG/1
10 TABLET ORAL DAILY
Qty: 90 | Refills: 0 | Status: ACTIVE | COMMUNITY
Start: 2025-03-03 | End: 1900-01-01

## 2025-03-31 ENCOUNTER — NON-APPOINTMENT (OUTPATIENT)
Age: 70
End: 2025-03-31

## 2025-03-31 ENCOUNTER — APPOINTMENT (OUTPATIENT)
Dept: FAMILY MEDICINE | Facility: CLINIC | Age: 70
End: 2025-03-31
Payer: MEDICARE

## 2025-03-31 VITALS
SYSTOLIC BLOOD PRESSURE: 116 MMHG | DIASTOLIC BLOOD PRESSURE: 88 MMHG | WEIGHT: 147 LBS | RESPIRATION RATE: 16 BRPM | HEIGHT: 71 IN | OXYGEN SATURATION: 96 % | TEMPERATURE: 97.2 F | HEART RATE: 69 BPM | BODY MASS INDEX: 20.58 KG/M2

## 2025-03-31 DIAGNOSIS — Z00.00 ENCOUNTER FOR GENERAL ADULT MEDICAL EXAMINATION W/OUT ABNORMAL FINDINGS: ICD-10-CM

## 2025-03-31 PROCEDURE — 99213 OFFICE O/P EST LOW 20 MIN: CPT | Mod: 25

## 2025-03-31 PROCEDURE — G0439: CPT

## 2025-04-01 ENCOUNTER — APPOINTMENT (OUTPATIENT)
Dept: CARDIOLOGY | Facility: CLINIC | Age: 70
End: 2025-04-01
Payer: MEDICARE

## 2025-04-01 ENCOUNTER — NON-APPOINTMENT (OUTPATIENT)
Age: 70
End: 2025-04-01

## 2025-04-01 VITALS
HEART RATE: 55 BPM | WEIGHT: 151 LBS | BODY MASS INDEX: 21.14 KG/M2 | DIASTOLIC BLOOD PRESSURE: 88 MMHG | HEIGHT: 71 IN | OXYGEN SATURATION: 97 % | SYSTOLIC BLOOD PRESSURE: 158 MMHG

## 2025-04-01 VITALS — DIASTOLIC BLOOD PRESSURE: 85 MMHG | SYSTOLIC BLOOD PRESSURE: 166 MMHG

## 2025-04-01 DIAGNOSIS — I10 ESSENTIAL (PRIMARY) HYPERTENSION: ICD-10-CM

## 2025-04-01 DIAGNOSIS — E78.5 HYPERLIPIDEMIA, UNSPECIFIED: ICD-10-CM

## 2025-04-01 DIAGNOSIS — I51.7 CARDIOMEGALY: ICD-10-CM

## 2025-04-01 PROCEDURE — 99203 OFFICE O/P NEW LOW 30 MIN: CPT

## 2025-04-15 ENCOUNTER — APPOINTMENT (OUTPATIENT)
Dept: CARDIOLOGY | Facility: CLINIC | Age: 70
End: 2025-04-15
Payer: MEDICARE

## 2025-04-15 PROCEDURE — 93306 TTE W/DOPPLER COMPLETE: CPT

## 2025-04-28 ENCOUNTER — APPOINTMENT (OUTPATIENT)
Dept: CARDIOLOGY | Facility: CLINIC | Age: 70
End: 2025-04-28
Payer: MEDICARE

## 2025-04-28 VITALS
HEIGHT: 71 IN | HEART RATE: 63 BPM | BODY MASS INDEX: 21.28 KG/M2 | WEIGHT: 152 LBS | OXYGEN SATURATION: 97 % | SYSTOLIC BLOOD PRESSURE: 124 MMHG | DIASTOLIC BLOOD PRESSURE: 78 MMHG

## 2025-04-28 DIAGNOSIS — E78.5 HYPERLIPIDEMIA, UNSPECIFIED: ICD-10-CM

## 2025-04-28 DIAGNOSIS — I10 ESSENTIAL (PRIMARY) HYPERTENSION: ICD-10-CM

## 2025-04-28 PROCEDURE — 99213 OFFICE O/P EST LOW 20 MIN: CPT

## 2025-05-28 ENCOUNTER — APPOINTMENT (OUTPATIENT)
Dept: FAMILY MEDICINE | Facility: CLINIC | Age: 70
End: 2025-05-28
Payer: MEDICARE

## 2025-05-28 VITALS
HEIGHT: 71 IN | DIASTOLIC BLOOD PRESSURE: 84 MMHG | WEIGHT: 155 LBS | TEMPERATURE: 98.3 F | BODY MASS INDEX: 21.7 KG/M2 | SYSTOLIC BLOOD PRESSURE: 136 MMHG | HEART RATE: 67 BPM | RESPIRATION RATE: 16 BRPM | OXYGEN SATURATION: 95 %

## 2025-05-28 DIAGNOSIS — Z11.59 ENCOUNTER FOR SCREENING FOR OTHER VIRAL DISEASES: ICD-10-CM

## 2025-05-28 LAB
FLUAV SPEC QL CULT: NEGATIVE
FLUBV AG SPEC QL IA: NEGATIVE
S PYO AG SPEC QL IA: NEGATIVE
SARS-COV-2 AG RESP QL IA.RAPID: NEGATIVE

## 2025-05-28 PROCEDURE — 87880 STREP A ASSAY W/OPTIC: CPT | Mod: QW

## 2025-05-28 PROCEDURE — 87811 SARS-COV-2 COVID19 W/OPTIC: CPT | Mod: QW

## 2025-05-28 PROCEDURE — 99213 OFFICE O/P EST LOW 20 MIN: CPT | Mod: 25

## 2025-05-28 PROCEDURE — 87804 INFLUENZA ASSAY W/OPTIC: CPT | Mod: QW

## 2025-05-29 LAB
FLUAV RNA NPH QL NAA+NON-PROBE: DETECTED
RESP PATH DNA+RNA PNL NPH NAA+NON-PROBE: DETECTED
SARS-COV-2 RNA RESP QL NAA+PROBE: NOT DETECTED

## 2025-05-30 DIAGNOSIS — J11.1 INFLUENZA DUE TO UNIDENTIFIED INFLUENZA VIRUS WITH OTHER RESPIRATORY MANIFESTATIONS: ICD-10-CM

## 2025-05-30 RX ORDER — BENZONATATE 100 MG/1
100 CAPSULE ORAL TWICE DAILY
Qty: 28 | Refills: 0 | Status: ACTIVE | COMMUNITY
Start: 2025-05-30 | End: 1900-01-01

## 2025-05-30 RX ORDER — OSELTAMIVIR PHOSPHATE 75 MG/1
75 CAPSULE ORAL TWICE DAILY
Qty: 1 | Refills: 0 | Status: ACTIVE | COMMUNITY
Start: 2025-05-30 | End: 1900-01-01

## 2025-06-10 NOTE — PRE-OP CHECKLIST - BLOOD AVAILABLE
Anemia Management Note - Reminder     Follow-up with anemia management service:    Unable to see if patient had labs done on [id number] as originally scheduled.  LVM for Jelly to call back         Latest Ref Rng & Units 6/8/2022 1/13/2025 1/15/2025 1/17/2025 5/5/2025 5/7/2025 5/9/2025   Anemia   ENZO Dose   300 mcg 300 mcg 300 mcg      Hemoglobin 11.7 - 15.7 g/dL 10.7          IV Iron Dose      300mg 300mg 300mg         Follow-up call date: [id number]    Carrie Leopold, RN BSN  Anemia Services  [facility]  [email or web address]  Office: [phone]  Fax [phone]             n/a

## 2025-09-15 ENCOUNTER — APPOINTMENT (OUTPATIENT)
Dept: FAMILY MEDICINE | Facility: CLINIC | Age: 70
End: 2025-09-15

## (undated) DEVICE — TUBING IV SET GRAVITY 3Y 100" MACRO

## (undated) DEVICE — FOLEY HOLDER STATLOCK 2 WAY ADULT

## (undated) DEVICE — BITE BLOCK ADULT 20 X 27MM (GREEN)

## (undated) DEVICE — SENSOR O2 FINGER ADULT

## (undated) DEVICE — TUBING SUCTION CONN 6FT STERILE

## (undated) DEVICE — CATH IV SAFE BC 20G X 1.16" (PINK)

## (undated) DEVICE — PACK IV START WITH CHG

## (undated) DEVICE — CATH IV SAFE BC 22G X 1" (BLUE)

## (undated) DEVICE — SYR ALLIANCE II INFLATION 60ML

## (undated) DEVICE — TUBING SUCTION 20FT

## (undated) DEVICE — SOL INJ NS 0.9% 500ML 2 PORT

## (undated) DEVICE — SUCTION YANKAUER NO CONTROL VENT

## (undated) DEVICE — BALLOON US ENDO

## (undated) DEVICE — BIOPSY FORCEP RADIAL JAW 4 STANDARD WITH NEEDLE